# Patient Record
Sex: FEMALE | Race: WHITE
[De-identification: names, ages, dates, MRNs, and addresses within clinical notes are randomized per-mention and may not be internally consistent; named-entity substitution may affect disease eponyms.]

---

## 2020-01-02 ENCOUNTER — HOSPITAL ENCOUNTER (EMERGENCY)
Dept: HOSPITAL 75 - ER | Age: 75
Discharge: HOME | End: 2020-01-02
Payer: MEDICARE

## 2020-01-02 VITALS — BODY MASS INDEX: 30.69 KG/M2 | HEIGHT: 66.93 IN | WEIGHT: 195.55 LBS

## 2020-01-02 VITALS — DIASTOLIC BLOOD PRESSURE: 79 MMHG | SYSTOLIC BLOOD PRESSURE: 141 MMHG
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
## 2020-01-02 DIAGNOSIS — Z88.5: ICD-10-CM

## 2020-01-02 DIAGNOSIS — Z82.49: ICD-10-CM

## 2020-01-02 DIAGNOSIS — F03.90: ICD-10-CM

## 2020-01-02 DIAGNOSIS — K21.9: ICD-10-CM

## 2020-01-02 DIAGNOSIS — Z88.2: ICD-10-CM

## 2020-01-02 DIAGNOSIS — N39.0: Primary | ICD-10-CM

## 2020-01-02 DIAGNOSIS — F41.9: ICD-10-CM

## 2020-01-02 DIAGNOSIS — Z87.891: ICD-10-CM

## 2020-01-02 DIAGNOSIS — F31.9: ICD-10-CM

## 2020-01-02 DIAGNOSIS — E03.9: ICD-10-CM

## 2020-01-02 LAB
APTT PPP: YELLOW S
BACTERIA #/AREA URNS HPF: (no result) /HPF
BILIRUB UR QL STRIP: NEGATIVE
FIBRINOGEN PPP-MCNC: (no result) MG/DL
GLUCOSE UR STRIP-MCNC: NEGATIVE MG/DL
KETONES UR QL STRIP: NEGATIVE
LEUKOCYTE ESTERASE UR QL STRIP: (no result)
NITRITE UR QL STRIP: POSITIVE
PH UR STRIP: 6 [PH] (ref 5–9)
PROT UR QL STRIP: (no result)
RBC #/AREA URNS HPF: (no result) /HPF
SP GR UR STRIP: >=1.03 (ref 1.02–1.02)
WBC #/AREA URNS HPF: >100 /HPF

## 2020-01-02 PROCEDURE — 99282 EMERGENCY DEPT VISIT SF MDM: CPT

## 2020-01-02 PROCEDURE — 87088 URINE BACTERIA CULTURE: CPT

## 2020-01-02 PROCEDURE — 81000 URINALYSIS NONAUTO W/SCOPE: CPT

## 2020-01-02 PROCEDURE — 87186 SC STD MICRODIL/AGAR DIL: CPT

## 2020-01-02 PROCEDURE — 87077 CULTURE AEROBIC IDENTIFY: CPT

## 2020-01-02 NOTE — ED GENERAL
General


Chief Complaint:  Trauma-Non Activation


Stated Complaint:  FALL


Nursing Triage Note:  


AMB TO ROOM WITH STAFF REPORTS SHE FELL PTA AND HIT HER HEAD NO LOC.  REPORTS 


BUMPED INTO STAFF AND FELL. STAFF REPORT POLICY TO HAVE HER CHECKED


Nursing Sepsis Screen:  No Definite Risk





History of Present Illness


Date Seen by Provider:  Jan 2, 2020


Time Seen by Provider:  14:50


Initial Comments


74-year-old  female is a resident at [facility], staff report 

that she ran into an employee, causing her to fall. She was not a hard impact. 

She did however hit a walker and possibly the wall with her head. She has a 

history of dementia. The fall was witnessed and there was no loss of 

consciousness. She has no complaints at this time, no areas of pain or 

tenderness, and is ambulatory with a steady gait. She is hard of hearing. The 

staff does report this is her second fall in the last 2 weeks, and a requesting 

a UA to check for urinary tract infection.


Timing/Duration:  1-3 Hours


Associated Systoms:  Denies Symptoms





Allergies and Home Medications


Allergies


Coded Allergies:  


     codeine (Unverified  Allergy, Mild, 4/22/09)


     Sulfa (Sulfonamide Antibiotics) (Unverified  Allergy, Unknown, 8/3/14)





Home Medications


Acetaminophen 500 Mg Tablet, 500-1,000 MG PO EVERY 4-6 HOURS PRN for PAIN, 

(Reported)


Bupropion Hcl 200 Mg Tablet.sa, 200 MG PO BID, (Reported)


Ciprofloxacin HCl 500 Mg Tablet, 500 MG PO BID


   Prescribed by: SILVIA ROSAS on 1/2/20 1602


Cyanocobalamin 1,000 Mcg/Ml  Vial, 1,000 MCG IM MONTHLY, (Reported)


Estrogens Conjugated 45 Gm Cr, 1 APPFUL VG MON, WED, FRI, (Reported)


Lactobacillus Acidophilus 1 Each Capsule, 1 CAP PO DAILY, (Reported)


Levothyroxine Sodium 75 Mcg Tablet, 75 MCG PO DAILY, (Reported)


Losartan Potassium 25 Mg Tablet, 25 MG PO HS, (Reported)


Metoprolol Tartrate 25 Mg Tablet, 37.5 MG PO 0800, 2000, (Reported)


   TAKES 1 & 1/2 (25MG) TABLET TWICE DAILY 


Mirtazapine 30 Mg Tablet, 30 MG PO HS, (Reported)


Nitrofurantoin Macrocrystal 100 Mg Capsule, 100 MG PO DAILY, (Reported)


Pantoprazole Sodium 40 Mg Tablet.dr, 1 TAB PO DAILY


   Prescribed by: JUSTA MONROE on 8/15/14 1158


Potassium Chloride 20 Meq Tab.prt.sr, 20 MEQ PO DAILY, (Reported)


Quetiapine Fumarate 50 Mg Tablet, 50 MG PO HS, (Reported)


Quetiapine Fumarate 25 Mg Tablet, 37.5 MG PO 0800, 1400, (Reported)


   TAKES 1 & 1/2 (25MG) TABLET TWICE DAILY AT 0800 AND 1400 


Rivastigmine Tartrate 4.6 Mg Patch, 4.6 MG TOP DAILY, (Reported)


Simvastatin 10 Mg Tablet, 10 MG PO HS, (Reported)


Sucralfate 1 Gm Tab, 1 GM PO QID


   Prescribed by: JUSTA MONROE on 8/15/14 1158


[Calcium 600MG]  , 600 MG PO DAILY, (Reported)





Patient Home Medication List


Home Medication List Reviewed:  Yes





Review of Systems


Review of Systems


Constitutional:  no symptoms reported, see HPI


Musculoskeletal:  no symptoms reported, see HPI





All Other Systems Reviewed


Negative Unless Noted:  Yes





Past Medical-Social-Family Hx


Past Med/Social Hx:  Reviewed Nursing Past Med/Soc Hx


Patient Social History


Alcohol Use:  Denies Use


Recreational Drug Use:  Yes (tobacco)


Smoking Status:  Former Smoker


2nd Hand Smoke Exposure:  No


Recent Foreign Travel:  No


Contact w/Someone Who Travel:  No


Recent Infectious Disease Expo:  No





Immunizations Up To Date


Tetanus Booster (TDap):  Unknown


Date of Influenza Vaccine:  Dec 2, 2013





Past Medical History


Surgeries:  Yes (EGD/COLONOSCOPIES, BACK SURG;splenectomy)


Respiratory:  No


Cardiac:  Yes


Neurological:  No


Reproductive Disorders:  Yes


Female Reproductive Disorders:  Denies


Sexually Transmitted Disease:  No


HIV/AIDS:  No


Bladder Infection


Gastrointestinal:  Yes


Colitis, Gastroesophageal Reflux, Gall Bladder Disease


Musculoskeletal:  Yes (CHRONIC GENERALIZED PAIN)


Arthritis, Back Injury, Chronic Back Pain


Endocrine:  Yes


Hypothyroidsim


Loss of Vision:  Bilateral


Hearing Impairment:  Hard of Hearing


Cancer:  No


Psychosocial:  Yes


Sleep Difficulties, Anxiety, Bipolar, Depression


Integumentary:  No


Blood Disorders:  No


Adverse Reaction/Blood Tranf:  No





Family Medical History





Alcoholism


  19 FATHER


  G8 BROTHER


CANC


  17 MOTHER


FH: cancer


Hypertension





Physical Exam


Vital Signs





Vital Signs - First Documented








 1/2/20





 14:39


 


Temp 36.9


 


Pulse 93


 


Resp 18


 


B/P (MAP) 141/79 (99)


 


Pulse Ox 97





Capillary Refill : NONE


Height, Weight, BMI


Height: 5'4.00"


Weight: 166lbs. 9.0oz. 75.502024sr; 30.00 BMI


Method:


General Appearance:  No Apparent Distress, WD/WN


Eyes:  Bilateral Eye Normal Inspection, Bilateral Eye PERRL, Bilateral Eye EOMI


HEENT:  PERRL/EOMI, TMs Normal, Normal ENT Inspection, Pharynx Normal, Other 

(head is normocephalic with no areas of tenderness, swelling, abrasions or 

lacerations.)


Neck:  Full Range of Motion, Normal Inspection, Non Tender, Supple


Respiratory:  Chest Non Tender, Lungs Clear, Normal Breath Sounds


Cardiovascular:  Regular Rate, Rhythm, No Edema, No Murmur, Normal Peripheral 

Pulses


Genital/Rectal:  Other (steady gait)


Back:  Normal Inspection, No CVA Tenderness, No Vertebral Tenderness


Extremity:  Normal Capillary Refill, Normal Inspection, Normal Range of Motion


Neurologic/Psychiatric:  Alert, Oriented x3, No Motor/Sensory Deficits, Normal 

Mood/Affect


Skin:  Normal Color, Warm/Dry





Progress/Results/Core Measures


Suspected Sepsis


Recent Fever Within 48 Hours:  No


Infection Criteria Present:  None


New/Unexplained  Altered Menta:  No


Sepsis Screen:  No Definite Risk


SIRS


Temperature: 


Pulse: 93 


Respiratory Rate: 18


 


Blood Pressure 141 /79 


Mean: 99





Results/Orders


Lab Results





Laboratory Tests








Test


 1/2/20


15:20 Range/Units


 


 


Urine Color YELLOW   


 


Urine Clarity SL CLOUDY   


 


Urine pH 6.0  5-9  


 


Urine Specific Gravity >=1.030  1.016-1.022  


 


Urine Protein TRACE  NEGATIVE  


 


Urine Glucose (UA) NEGATIVE  NEGATIVE  


 


Urine Ketones NEGATIVE  NEGATIVE  


 


Urine Nitrite POSITIVE  NEGATIVE  


 


Urine Bilirubin NEGATIVE  NEGATIVE  


 


Urine Urobilinogen 0.2  < = 1.0  MG/DL


 


Urine Leukocyte Esterase 2+ H NEGATIVE  


 


Urine RBC (Auto) 3+ H NEGATIVE  


 


Urine RBC 5-10 H  /HPF


 


Urine WBC >100 H  /HPF


 


Urine Crystals NONE   /LPF


 


Urine Bacteria MODERATE H  /HPF


 


Urine Casts NONE   /LPF


 


Urine Mucus NEGATIVE   /LPF


 


Urine Culture Indicated YES   








My Orders





Orders - SILVIA ROSAS


Ua Culture If Indicated (1/2/20 15:02)


Urine Culture (1/2/20 15:20)





Vital Signs/I&O











 1/2/20 1/2/20





 14:39 16:06


 


Temp 36.9 36.9


 


Pulse 93 93


 


Resp 18 18


 


B/P (MAP) 141/79 (99) 141/79 (99)


 


Pulse Ox 97 97





Capillary Refill : NONE








Blood Pressure Mean:                    99








Progress Note :  


   Time:  14:50


Progress Note


Patient seen and evaluated, no indication for diagnostic imaging at this time. 

We'll obtain a UA. Denies need for pain medication. 


1600 UA results reviewed with the patient and her caregiver. Discharge 

instructions and return precautions reviewed.





Departure


Impression





   Primary Impression:  


   Fall


   Qualified Codes:  W19.XXXA - Unspecified fall, initial encounter


   Additional Impression:  


   UTI (urinary tract infection)


   Qualified Codes:  N30.01 - Acute cystitis with hematuria


Disposition:  01 HOME, SELF-CARE


Condition:  Improved





Departure-Patient Inst.


Decision time for Depature:  16:00


Referrals:  


CECI CABRERA MD (PCP/Family)


Primary Care Physician


Patient Instructions:  Preventing Falls in the Older Adult, Urinary Tract 

Infection, Adult (DC)





Add. Discharge Instructions:  


Increase water intake, 16 ounces every 2 hours while awake.


Take prescribed antibiotics as directed.


Follow-up with your primary care provider if symptoms are not improving or 

worsen.


Drink 1 cup of cranberry juice or eat 1 cup of fresh blueberries daily.


Return to emergency department for new, urgent health care needs.





All discharge instructions reviewed with patient and/or family. Voiced 

understanding.


Scripts


Ciprofloxacin HCl (Ciprofloxacin HCl) 500 Mg Tablet


500 MG PO BID, #10 TAB 0 Refills


   Prov: SILVIA ROSAS         1/2/20





Copy


Copies To 1:   CECI CABRERA MD, AMY ARNP                   Jan 2, 2020 15:01

## 2020-02-06 ENCOUNTER — HOSPITAL ENCOUNTER (EMERGENCY)
Dept: HOSPITAL 75 - ER | Age: 75
Discharge: HOME | End: 2020-02-06
Payer: MEDICARE

## 2020-02-06 VITALS — HEIGHT: 64.96 IN | WEIGHT: 176.37 LBS | BODY MASS INDEX: 29.38 KG/M2

## 2020-02-06 VITALS — DIASTOLIC BLOOD PRESSURE: 78 MMHG | SYSTOLIC BLOOD PRESSURE: 144 MMHG

## 2020-02-06 DIAGNOSIS — Z88.2: ICD-10-CM

## 2020-02-06 DIAGNOSIS — F03.90: Primary | ICD-10-CM

## 2020-02-06 DIAGNOSIS — R40.2362: ICD-10-CM
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
## 2020-02-06 DIAGNOSIS — F31.9: ICD-10-CM

## 2020-02-06 DIAGNOSIS — F41.9: ICD-10-CM

## 2020-02-06 DIAGNOSIS — R40.2142: ICD-10-CM

## 2020-02-06 DIAGNOSIS — S70.01XA: ICD-10-CM

## 2020-02-06 DIAGNOSIS — K21.9: ICD-10-CM

## 2020-02-06 DIAGNOSIS — Z88.5: ICD-10-CM

## 2020-02-06 DIAGNOSIS — E03.9: ICD-10-CM

## 2020-02-06 DIAGNOSIS — R40.2252: ICD-10-CM

## 2020-02-06 DIAGNOSIS — Z82.49: ICD-10-CM

## 2020-02-06 DIAGNOSIS — S60.512A: ICD-10-CM

## 2020-02-06 LAB
ALBUMIN SERPL-MCNC: 3.8 GM/DL (ref 3.2–4.5)
ALP SERPL-CCNC: 132 U/L (ref 40–136)
ALT SERPL-CCNC: 10 U/L (ref 0–55)
APTT PPP: YELLOW S
BACTERIA #/AREA URNS HPF: NEGATIVE /HPF
BASOPHILS # BLD AUTO: 0 10^3/UL (ref 0–0.1)
BASOPHILS NFR BLD AUTO: 0 % (ref 0–10)
BILIRUB SERPL-MCNC: 0.5 MG/DL (ref 0.1–1)
BILIRUB UR QL STRIP: NEGATIVE
BUN/CREAT SERPL: 14
CALCIUM SERPL-MCNC: 9.3 MG/DL (ref 8.5–10.1)
CHLORIDE SERPL-SCNC: 110 MMOL/L (ref 98–107)
CO2 SERPL-SCNC: 23 MMOL/L (ref 21–32)
CREAT SERPL-MCNC: 2.24 MG/DL (ref 0.6–1.3)
EOSINOPHIL # BLD AUTO: 0.1 10^3/UL (ref 0–0.3)
EOSINOPHIL NFR BLD AUTO: 2 % (ref 0–10)
ERYTHROCYTE [DISTWIDTH] IN BLOOD BY AUTOMATED COUNT: 15.2 % (ref 10–14.5)
FIBRINOGEN PPP-MCNC: (no result) MG/DL
GFR SERPLBLD BASED ON 1.73 SQ M-ARVRAT: 21 ML/MIN
GLUCOSE SERPL-MCNC: 123 MG/DL (ref 70–105)
GLUCOSE UR STRIP-MCNC: NEGATIVE MG/DL
HCT VFR BLD CALC: 38 % (ref 35–52)
HGB BLD-MCNC: 11.4 G/DL (ref 11.5–16)
KETONES UR QL STRIP: NEGATIVE
LEUKOCYTE ESTERASE UR QL STRIP: (no result)
LYMPHOCYTES # BLD AUTO: 1.4 X 10^3 (ref 1–4)
LYMPHOCYTES NFR BLD AUTO: 19 % (ref 12–44)
MANUAL DIFFERENTIAL PERFORMED BLD QL: NO
MCH RBC QN AUTO: 27 PG (ref 25–34)
MCHC RBC AUTO-ENTMCNC: 30 G/DL (ref 32–36)
MCV RBC AUTO: 88 FL (ref 80–99)
MONOCYTES # BLD AUTO: 0.4 X 10^3 (ref 0–1)
MONOCYTES NFR BLD AUTO: 6 % (ref 0–12)
NEUTROPHILS # BLD AUTO: 5.5 X 10^3 (ref 1.8–7.8)
NEUTROPHILS NFR BLD AUTO: 74 % (ref 42–75)
NITRITE UR QL STRIP: NEGATIVE
PH UR STRIP: 5.5 [PH] (ref 5–9)
PLATELET # BLD: 260 10^3/UL (ref 130–400)
PMV BLD AUTO: 9.8 FL (ref 7.4–10.4)
POTASSIUM SERPL-SCNC: 5 MMOL/L (ref 3.6–5)
PROT SERPL-MCNC: 7 GM/DL (ref 6.4–8.2)
PROT UR QL STRIP: NEGATIVE
RBC #/AREA URNS HPF: (no result) /HPF
SODIUM SERPL-SCNC: 142 MMOL/L (ref 135–145)
SP GR UR STRIP: >=1.03 (ref 1.02–1.02)
WBC # BLD AUTO: 7.5 10^3/UL (ref 4.3–11)
WBC #/AREA URNS HPF: (no result) /HPF

## 2020-02-06 PROCEDURE — 80053 COMPREHEN METABOLIC PANEL: CPT

## 2020-02-06 PROCEDURE — 71045 X-RAY EXAM CHEST 1 VIEW: CPT

## 2020-02-06 PROCEDURE — 85025 COMPLETE CBC W/AUTO DIFF WBC: CPT

## 2020-02-06 PROCEDURE — 70450 CT HEAD/BRAIN W/O DYE: CPT

## 2020-02-06 PROCEDURE — 81000 URINALYSIS NONAUTO W/SCOPE: CPT

## 2020-02-06 PROCEDURE — 87088 URINE BACTERIA CULTURE: CPT

## 2020-02-06 PROCEDURE — 72125 CT NECK SPINE W/O DYE: CPT

## 2020-02-06 PROCEDURE — 71250 CT THORAX DX C-: CPT

## 2020-02-06 PROCEDURE — 36415 COLL VENOUS BLD VENIPUNCTURE: CPT

## 2020-02-06 NOTE — DIAGNOSTIC IMAGING REPORT
INDICATION:  Unwitnessed fall this morning.



TECHNIQUE:  Single-view chest at 02:11 p.m.



CORRELATION STUDY:  01/07/2020.



FINDINGS: 

Heart size and pulmonary vasculature are generally stable.

Mediastinal configuration along the left superior margins does

appear to be slightly more prominent from prior studies.  

Chronic-appearing changes about the lung parenchyma.



Suggestion of some reparative change with likely a subacute left

lateral fifth rib fracture.



IMPRESSION: 

1. Suggestion of prominence about the superior mediastinum. This

may very well be owing to change in position; however,

possibility of underlying mediastinal pathology including

associated with the aortic arch not excluded. If clinically

warranted, correlation with CT imaging chest would be

recommended.

2. Likely subacute left lateral fifth rib fracture.



Dictated by: 



  Dictated on workstation # [id number]

## 2020-02-06 NOTE — DIAGNOSTIC IMAGING REPORT
PROCEDURE: CT head and CT cervical spine without contrast.



TECHNIQUE: Multiple contiguous axial images were obtained through

the brain and cervical spine without the use of intravenous

contrast. Sagittal and coronal reformations through the cervical

spine were then performed. Auto Exposure Controls were utilized

during the CT exam to meet ALARA standards for radiation dose

reduction. 



INDICATION:  Unwitnessed fall. Scalp contusion.



COMPARISON: 01/06/2020.



FINDINGS: 

CT head: No large acute territorial ischemia, mass, or

hemorrhage. No midline shift or mass effect. Decreased

attenuation is seen in the periventricular and subcortical white

matter. The ventricles and cortical sulci are prominent. The

basilar cisterns are patent and unremarkable. 



The calvarium is intact. The visualized paranasal sinuses are

clear.



CT cervical spine: No acute fracture or dislocation is seen in

the cervical spine. No focal osseous lesions. Vertebral body

heights are well-maintained. The craniocervical junction is

well-maintained. Moderate-to-severe degenerative changes are seen

in the cervical spine with disc osteophyte complexes and

uncovertebral arthropathy. Soft tissues of the neck are

unremarkable.



IMPRESSION:  

1. No hemorrhage or focal intra-axial mass.  No CT evidence of

large acute territorial ischemia.  

2. No acute fracture or dislocation in the cervical spine.

3. Chronic microvascular disease with generalized parenchymal

volume loss.

4. Moderate-to-severe degenerative changes in the cervical spine.



Dictated by: 



  Dictated on workstation # [id number]

## 2020-02-06 NOTE — DIAGNOSTIC IMAGING REPORT
INDICATION:  Unwitnessed fall earlier in the day.



TECHNIQUE:  AP pelvis along with 2 views right hip, 2:15 PM



CORRELATION STUDY:  

None



FINDINGS: 

The pelvis demonstrates no evidence for acute fracture. The

pectineal lines and obturator rings are maintained. Pubic

symphysis and SI joints are unremarkable. Posterior L4-L5 lumbar

spinal fusion hardware.



Bilateral hip joints demonstrate narrowing. Mildly prominent

overhanging osteophytes bilaterally. Imaging of the right hip

demonstrates the bony trabecular pattern to be intact.



IMPRESSION: 

Negative for acute abnormality about the pelvis and/or right hip.

Mild moderately advanced degenerative changes of both hips are

present. .



Dictated by: 



  Dictated on workstation # [id number]

## 2020-02-06 NOTE — DIAGNOSTIC IMAGING REPORT
EXAMINATION: CT Chest without contrast.



TECHNIQUE: Multiple contiguous axial images were obtained through

the chest without the use of intravenous contrast. All CT scans

use one or more of the following dose optimizing techniques:

automated exposure control, MA and/or KvP adjustment based on a

patient size and exam type, or iterative reconstruction. 



HISTORY: Fall.



COMPARISON: None available.



FINDINGS: 



There is no edema or pneumonia. No pleural effusion. No

pneumothorax. No suspicious nodules. There is mild dependent

atelectasis in the lungs.



Heart size is normal. No pericardial effusion. Aorta is normal in

caliber. There is no axillary or supraclavicular lymphadenopathy.

There is no mediastinal lymphadenopathy. There are moderate

coronary artery calcifications. Calcified granulomas are seen in

the right hilum.



Calcified granulomas are seen in the liver and spleen.

Gallbladder is absent. 



There are subacute-to-chronic left third, fourth, and fifth rib

fractures. There is a Schmorl's node in the inferior endplate of

T12.



IMPRESSION:



1. Subacute chronic left third, fourth, and fifth rib fractures

without evidence for acute trauma in the chest.



Dictated by: 



  Dictated on workstation # [id number]

## 2020-02-06 NOTE — ED TRAUMA-MULTISYSTEM
General


Chief Complaint:  Trauma-Non Activation


Stated Complaint:  FALL


Source of Information:  Patient, EMS


Exam Limitations:  No Limitations





History of Present Illness


Date Seen by Provider:  Feb 6, 2020


Time Seen by Provider:  13:14


Initial Comments


75-year-old female who was brought to the emergency room by [facility] 

from local nursing home with reported increased falls over the past 5 days and 

increased confusion. The patient has a baseline of dementia which is the cause 

of her needing nursing home care 24 7. She is altered at baseline. She is alert 

to person and place on arrival to the emergency room. She denies any pain from 

her falls. Nursing home staff reports that she was started on Cefdnir a few days

ago for urinary tract infection. She has some dried blood on her report lips 

staff is concerned that she struck her head with her fall this morning. She also

has a large hematoma to her right hip area from a recent fall and abrasions to 

the dorsal surface of her left hand.


Loss of Consciousness:  Unsure


Associated Symptoms (Fall):  Denies Symptoms





Allergies and Home Medications


Allergies


Coded Allergies:  


     codeine (Unverified  Allergy, Mild, 4/22/09)


     Sulfa (Sulfonamide Antibiotics) (Unverified  Allergy, Unknown, 8/3/14)





Home Medications


Acetaminophen 500 Mg Tablet, 500-1,000 MG PO EVERY 4-6 HOURS PRN for PAIN-MILD 

(1-4), (Reported)


Amoxicillin/Potassium Clav 1 Each Tablet, 1 EACH PO BID


   Prescribed by: CECI DANIELLE on 1/7/20 0857


Bupropion HCl 200 Mg Tablet.er, 200 MG PO BID, (Reported)


Calcium Carbonate 600 Mg Tablet, 600 MG PO DAILY, (Reported)


Cholecalciferol (Vitamin D3) 5,000 Unit Capsule, 5,000 UNIT PO DAILY, (Reported)


Cyanocobalamin 1,000 Mcg/Ml Inj, 1,000 MCG IM MONTHLY ON THE 5TH, (Reported)


Cyanocobalamin (Vitamin B-12) 1,000 Mcg Tab.subl, 1,000 MCG SL DAILY, (Reported)


Fluvoxamine Maleate 100 Mg Tablet, 100 MG PO DAILY, (Reported)


Fluvoxamine Maleate 50 Mg Tablet, 50 MG PO HS, (Reported)


L. Acidophilus/Bulgaricus 1 Each Tablet, 1 TAB PO UD


   THREE TIMES DAILY X 1 WEEK THEN DAILY THEREAFTER 


   Prescribed by: CECI DANIELLE on 1/7/20 0857


Levothyroxine Sodium 88 Mcg Tablet, 88 MCG PO DAILY, (Reported)


Losartan Potassium 25 Mg Tablet, 25 MG PO HS, (Reported)


Memantine HCl 28 Mg Cap.spr.24, 28 MG PO DAILY, (Reported)


Metoprolol Tartrate 25 Mg Tablet, 37.5 MG PO BID, (Reported)


   TAKES 1 & 1/2 (25MG) TABLETS 


Mirtazapine 15 Mg Tablet, 15 MG PO HS, (Reported)


Omeprazole 20 Mg Capsule.dr, 20 MG PO HS, (Reported)


Potassium Chloride 20 Meq Tab.er.prt, 20 MEQ PO DAILY, (Reported)


Quetiapine Fumarate 25 Mg Tablet, 25 MG PO HS, (Reported)


Quetiapine Fumarate 25 Mg Tablet, 12.5 MG PO 0800,1400, (Reported)


   TAKES 1/2 (25MG) TABLET 


Simvastatin 10 Mg Tablet, 10 MG PO HS, (Reported)


Sucralfate 1 Gm Tablet, 1 GM PO UD PRN for STOMACH UPSET, (Reported)


Trimethoprim 100 Mg Tablet, 100 MG PO DAILY


   DO NOT RESTART UNTIL 1/13/2020 


   Prescribed by: CECI DANIELLE on 1/7/20 0857





Patient Home Medication List


Home Medication List Reviewed:  Yes





Review of Systems


Review of Systems


Constitutional:  see HPI; No chills, No fever


Musculoskeletal:  see HPI, other (recent fall)


Psychiatric/Neurological:  See HPI, Other (increased confusion)





All Other Systems Reviewed


Negative Unless Noted:  Yes





Past Medical-Social-Family Hx


Past Med/Social Hx:  Reviewed Nursing Past Med/Soc Hx


Patient Social History


Type Used:  Cigarettes


2nd Hand Smoke Exposure:  No


Recent Hopitalizations:  No





Immunizations Up To Date


Tetanus Booster (TDap):  Unknown


Date of Influenza Vaccine:  Oct 1, 2019





Seasonal Allergies


Seasonal Allergies:  Yes





Past Medical History


Surgeries:  Yes (EGD/COLONOSCOPIES, BACK SURG;splenectomy)


Breast, Gallbladder, Hysterectomy, Orthopedic


Respiratory:  No


Cardiac:  Yes


Neurological:  No


Reproductive Disorders:  Yes


Female Reproductive Disorders:  Denies


Sexually Transmitted Disease:  No


HIV/AIDS:  No


Bladder Infection


Gastrointestinal:  Yes


Colitis, Gastroesophageal Reflux, Gall Bladder Disease


Musculoskeletal:  Yes (CHRONIC GENERALIZED PAIN)


Arthritis, Back Injury, Chronic Back Pain


Endocrine:  Yes


Hypothyroidsim


Loss of Vision:  Bilateral


Hearing Impairment:  Hard of Hearing


Cancer:  No


Psychosocial:  Yes


Sleep Difficulties, Anxiety, Bipolar, Depression


Integumentary:  No


Blood Disorders:  No


Adverse Reaction/Blood Tranf:  No





Family Medical History


Reviewed Nursing Family Hx





Alcoholism


  19 FATHER


  G8 BROTHER


CANC


  19 MOTHER


FH: cancer


Cancer, Hypertension





Physical Exam


Vital Signs





Vital Signs - First Documented








 2/6/20





 13:26


 


Temp 37.1


 


Pulse 84


 


Resp 20


 


B/P (MAP) 144/78 (100)


 


Pulse Ox 94








Height, Weight, BMI


Height: 5'4.00"


Weight: 166lbs. 9.0oz. 75.632050ed; 32.32 BMI


Method:


General Appearance:  No Apparent Distress, WD/WN


Head:  Ecchymosis (large area of ecchymosis and hematoma to her right hip.), 

Other (abrasions to the dorsal surface of her left hand)


Ears, Nose, Throat:  No Evidence of ENT Injury, No Dental Injury, Other (hard of

hearing)


Neck:  Full Range of Motion, Normal Inspection, Non Tender, Supple


Cardiovascular:  Regular Rate, Rhythm, No Edema, No Gallop, No JVD, No Murmur, 

Normal Peripheral Pulses


Respiratory:  Chest Non Tender, Lungs Clear, Normal Breath Sounds, No Accessory 

Muscle Use, No Respiratory Distress


Gastrointestinal:  Normal Bowel Sounds, No Organomegaly, No Pulsatile Mass, Non 

Tender, Soft


Extremity:  Normal Capillary Refill


Neurologic/Psychiatric:  Alert, Normal Mood/Affect, Other (baseline of dementia)


Skin:  Normal Color, Warm/Dry





Simon Coma Score


Best Eye Response ([place]):  (4) Open Spontaneously


Best Verbal Response ([place]):  (5) Oriented


Best Motor Response ([place]):  (6) Obeys Commands





Progress/Results/Core Measures


Results/Orders


Lab Results





Laboratory Tests








Test


 2/6/20


13:09 2/6/20


13:15 2/6/20


15:35 Range/Units


 


 


Lab Scanned Report


 Referred Lab


Report 


 


  [id number]





 


White Blood Count


 


 7.5 


 


 4.3-11.0


10^3/uL


 


Red Blood Count


 


 4.30 L


 


 4.35-5.85


10^6/uL


 


Hemoglobin  11.4 L  11.5-16.0  G/DL


 


Hematocrit  38   35-52  %


 


Mean Corpuscular Volume  88   80-99  FL


 


Mean Corpuscular Hemoglobin  27   25-34  PG


 


Mean Corpuscular Hemoglobin


Concent 


 30 L


 


 32-36  G/DL





 


Red Cell Distribution Width  15.2 H  10.0-14.5  %


 


Platelet Count


 


 260 


 


 130-400


10^3/uL


 


Mean Platelet Volume  9.8   7.4-10.4  FL


 


Neutrophils (%) (Auto)  74   42-75  %


 


Lymphocytes (%) (Auto)  19   12-44  %


 


Monocytes (%) (Auto)  6   0-12  %


 


Eosinophils (%) (Auto)  2   0-10  %


 


Basophils (%) (Auto)  0   0-10  %


 


Neutrophils # (Auto)  5.5   1.8-7.8  X 10^3


 


Lymphocytes # (Auto)  1.4   1.0-4.0  X 10^3


 


Monocytes # (Auto)  0.4   0.0-1.0  X 10^3


 


Eosinophils # (Auto)


 


 0.1 


 


 0.0-0.3


10^3/uL


 


Basophils # (Auto)


 


 0.0 


 


 0.0-0.1


10^3/uL


 


Sodium Level  142   135-145  MMOL/L


 


Potassium Level  5.0   3.6-5.0  MMOL/L


 


Chloride Level  110 H    MMOL/L


 


Carbon Dioxide Level  23   21-32  MMOL/L


 


Anion Gap  9   5-14  MMOL/L


 


Blood Urea Nitrogen  31 H  7-18  MG/DL


 


Creatinine


 


 2.24 H


 


 0.60-1.30


MG/DL


 


Estimat Glomerular Filtration


Rate 


 21 


 


  





 


BUN/Creatinine Ratio  14    


 


Glucose Level  123 H    MG/DL


 


Calcium Level  9.3   8.5-10.1  MG/DL


 


Corrected Calcium  9.5   8.5-10.1  MG/DL


 


Total Bilirubin  0.5   0.1-1.0  MG/DL


 


Aspartate Amino Transf


(AST/SGOT) 


 11 


 


 5-34  U/L





 


Alanine Aminotransferase


(ALT/SGPT) 


 10 


 


 0-55  U/L





 


Alkaline Phosphatase  132     U/L


 


Total Protein  7.0   6.4-8.2  GM/DL


 


Albumin  3.8   3.2-4.5  GM/DL


 


Urine Color   YELLOW   


 


Urine Clarity   CLOUDY   


 


Urine pH   5.5  5-9  


 


Urine Specific Gravity   >=1.030  1.016-1.022  


 


Urine Protein   NEGATIVE  NEGATIVE  


 


Urine Glucose (UA)   NEGATIVE  NEGATIVE  


 


Urine Ketones   NEGATIVE  NEGATIVE  


 


Urine Nitrite   NEGATIVE  NEGATIVE  


 


Urine Bilirubin   NEGATIVE  NEGATIVE  


 


Urine Urobilinogen   0.2  < = 1.0  MG/DL


 


Urine Leukocyte Esterase   2+ H NEGATIVE  


 


Urine RBC (Auto)   TRACE-I  NEGATIVE  


 


Urine RBC   2-5 H  /HPF


 


Urine WBC   TNTC H  /HPF


 


Urine Squamous Epithelial


Cells 


 


 10-25 H


  /HPF





 


Urine Crystals   NONE   /LPF


 


Urine Bacteria   NEGATIVE   /HPF


 


Urine Casts   NONE   /LPF


 


Urine Mucus   NEGATIVE   /LPF


 


Urine Culture Indicated   YES   








Micro Results





Microbiology


2/6/20 Urine Culture - Final, Complete


         Gram Pos Mixed Bacterial Latasha


         Corynebacterium JK(Jeikeium)





My Orders





Orders - DANG BROWNLEE


Ct Head/Cervical Spine Wo (2/6/20 13:12)


Chest 1 View, Ap/Pa Only (2/6/20 13:12)


Pelvis With Right Hip 2-3views (2/6/20 13:12)


Comprehensive Metabolic Panel (2/6/20 13:12)


Ua Culture If Indicated (2/6/20 13:12)


Ed Iv/Invasive Line Start (2/6/20 13:12)


Cbc With Automated Diff (2/6/20 13:12)


Ns Iv 1000 Ml (Sodium Chloride 0.9%) (2/6/20 15:00)


Ct Chest Wo (2/6/20 16:14)


Urine Culture (2/6/20 15:35)





Vital Signs/I&O











 2/6/20 2/6/20





 13:26 17:55


 


Temp 37.1 37.1


 


Pulse 84 84


 


Resp 20 20


 


B/P (MAP) 144/78 (100) 144/78 (100)


 


Pulse Ox 94 94











Progress


Progress Note :  


   Time:  17:17


Progress Note


I have seen and evaluated the patient. Findings were discussed with patient 

family and nursing home staff via phone. Patient has follow up with Dr. Farah

tons office this week.





Departure


Impression





   Primary Impression:  


   Advancing dementia


Disposition:  01 HOME, SELF-CARE


Condition:  Stable/Unchanged





Departure-Patient Inst.


Decision time for Depature:  17:17


Referrals:  


CECI DANIELLE MD (PCP/Family)


Primary Care Physician


Patient Instructions:  Dementia (DC)





Add. Discharge Instructions:  


Continue medications as previously prescribed. Follow-up with Dr. Danielle's 

office within 1 week for recheck. Return back to the emergency room for 

worsening symptoms or concerns as needed. All discharge instructions reviewed w

[facility] patient and/or family. Voiced understanding.











DANG BROWNLEE                   Feb 6, 2020 13:28

## 2020-07-06 ENCOUNTER — HOSPITAL ENCOUNTER (EMERGENCY)
Dept: HOSPITAL 75 - ER | Age: 75
Discharge: NURSING, REHAB OR LONG-TERM CARE | End: 2020-07-06
Payer: MEDICARE

## 2020-07-06 VITALS — WEIGHT: 180.78 LBS | HEIGHT: 66.93 IN | BODY MASS INDEX: 28.37 KG/M2

## 2020-07-06 VITALS — DIASTOLIC BLOOD PRESSURE: 80 MMHG | SYSTOLIC BLOOD PRESSURE: 143 MMHG

## 2020-07-06 DIAGNOSIS — M54.9: ICD-10-CM

## 2020-07-06 DIAGNOSIS — K52.9: ICD-10-CM

## 2020-07-06 DIAGNOSIS — Z90.81: ICD-10-CM

## 2020-07-06 DIAGNOSIS — Z79.899: ICD-10-CM

## 2020-07-06 DIAGNOSIS — K21.9: ICD-10-CM

## 2020-07-06 DIAGNOSIS — F41.9: ICD-10-CM

## 2020-07-06 DIAGNOSIS — Z88.5: ICD-10-CM

## 2020-07-06 DIAGNOSIS — Z79.890: ICD-10-CM

## 2020-07-06 DIAGNOSIS — Z88.2: ICD-10-CM

## 2020-07-06 DIAGNOSIS — K59.09: ICD-10-CM

## 2020-07-06 DIAGNOSIS — N39.0: Primary | ICD-10-CM

## 2020-07-06 DIAGNOSIS — E03.9: ICD-10-CM

## 2020-07-06 DIAGNOSIS — F31.9: ICD-10-CM

## 2020-07-06 DIAGNOSIS — M19.90: ICD-10-CM

## 2020-07-06 LAB
ALBUMIN SERPL-MCNC: 3.8 GM/DL (ref 3.2–4.5)
ALP SERPL-CCNC: 144 U/L (ref 40–136)
ALT SERPL-CCNC: 8 U/L (ref 0–55)
APTT PPP: YELLOW S
BACTERIA #/AREA URNS HPF: (no result) /HPF
BASOPHILS # BLD AUTO: 0 10^3/UL (ref 0–0.1)
BASOPHILS NFR BLD AUTO: 0 % (ref 0–10)
BILIRUB SERPL-MCNC: 0.2 MG/DL (ref 0.1–1)
BILIRUB UR QL STRIP: NEGATIVE
BUN/CREAT SERPL: 9
CALCIUM SERPL-MCNC: 8.8 MG/DL (ref 8.5–10.1)
CHLORIDE SERPL-SCNC: 110 MMOL/L (ref 98–107)
CO2 SERPL-SCNC: 15 MMOL/L (ref 21–32)
CREAT SERPL-MCNC: 2.93 MG/DL (ref 0.6–1.3)
EOSINOPHIL # BLD AUTO: 0.3 10^3/UL (ref 0–0.3)
EOSINOPHIL NFR BLD AUTO: 3 % (ref 0–10)
ERYTHROCYTE [DISTWIDTH] IN BLOOD BY AUTOMATED COUNT: 15.2 % (ref 10–14.5)
FIBRINOGEN PPP-MCNC: (no result) MG/DL
GFR SERPLBLD BASED ON 1.73 SQ M-ARVRAT: 16 ML/MIN
GLUCOSE SERPL-MCNC: 124 MG/DL (ref 70–105)
GLUCOSE UR STRIP-MCNC: NEGATIVE MG/DL
HCT VFR BLD CALC: 41 % (ref 35–52)
HGB BLD-MCNC: 12.5 G/DL (ref 11.5–16)
KETONES UR QL STRIP: NEGATIVE
LEUKOCYTE ESTERASE UR QL STRIP: (no result)
LYMPHOCYTES # BLD AUTO: 3 X 10^3 (ref 1–4)
LYMPHOCYTES NFR BLD AUTO: 32 % (ref 12–44)
MANUAL DIFFERENTIAL PERFORMED BLD QL: NO
MCH RBC QN AUTO: 27 PG (ref 25–34)
MCHC RBC AUTO-ENTMCNC: 31 G/DL (ref 32–36)
MCV RBC AUTO: 87 FL (ref 80–99)
MONOCYTES # BLD AUTO: 0.6 X 10^3 (ref 0–1)
MONOCYTES NFR BLD AUTO: 7 % (ref 0–12)
NEUTROPHILS # BLD AUTO: 5.3 X 10^3 (ref 1.8–7.8)
NEUTROPHILS NFR BLD AUTO: 58 % (ref 42–75)
NITRITE UR QL STRIP: NEGATIVE
PH UR STRIP: 6 [PH] (ref 5–9)
PLATELET # BLD: 235 10^3/UL (ref 130–400)
PMV BLD AUTO: 10.3 FL (ref 7.4–10.4)
POTASSIUM SERPL-SCNC: 4.7 MMOL/L (ref 3.6–5)
PROT SERPL-MCNC: 6.9 GM/DL (ref 6.4–8.2)
PROT UR QL STRIP: NEGATIVE
RBC #/AREA URNS HPF: (no result) /HPF
RENAL EPI CELLS #/AREA URNS HPF: (no result) /HPF
SODIUM SERPL-SCNC: 142 MMOL/L (ref 135–145)
SP GR UR STRIP: 1.02 (ref 1.02–1.02)
SQUAMOUS #/AREA URNS HPF: (no result) /HPF
T4 FREE SERPL-MCNC: 0.92 NG/DL (ref 0.7–1.48)
WBC # BLD AUTO: 9.2 10^3/UL (ref 4.3–11)
WBC #/AREA URNS HPF: (no result) /HPF
YEAST #/AREA URNS HPF: (no result) /HPF

## 2020-07-06 PROCEDURE — 84443 ASSAY THYROID STIM HORMONE: CPT

## 2020-07-06 PROCEDURE — 87077 CULTURE AEROBIC IDENTIFY: CPT

## 2020-07-06 PROCEDURE — 36415 COLL VENOUS BLD VENIPUNCTURE: CPT

## 2020-07-06 PROCEDURE — 81000 URINALYSIS NONAUTO W/SCOPE: CPT

## 2020-07-06 PROCEDURE — 80053 COMPREHEN METABOLIC PANEL: CPT

## 2020-07-06 PROCEDURE — 93005 ELECTROCARDIOGRAM TRACING: CPT

## 2020-07-06 PROCEDURE — 84439 ASSAY OF FREE THYROXINE: CPT

## 2020-07-06 PROCEDURE — 85025 COMPLETE CBC W/AUTO DIFF WBC: CPT

## 2020-07-06 PROCEDURE — 87088 URINE BACTERIA CULTURE: CPT

## 2020-07-06 PROCEDURE — 87186 SC STD MICRODIL/AGAR DIL: CPT

## 2020-07-06 NOTE — ED GENERAL
General


Stated Complaint:  SYNCOPE EPISODE


Source of Information:  Patient


Exam Limitations:  No Limitations





History of Present Illness


Date Seen by Provider:  Jul 6, 2020


Time Seen by Provider:  15:21


Initial Comments


To ER by EMS from [facility] here in [place] with reports of an 

unresponsive episode at the nursing home today lasting about 10 minutes. Upon 

EMS arrival she was alert and oriented with normal vital signs. On arrival to ER

she states she doesn't have any pain nausea vomiting shortness of breath. She 

states she just feels "blah..up here" and points to her head. She reports that 

she is depressed and has been depressed for 20 years. She is not interested in 

going inpatient for any treatment for this. She is not homicidal or suicidal


Timing/Duration:  1-3 Hours


Severity:  Moderate


Associated Systoms:  Denies Symptoms





Allergies and Home Medications


Allergies


Coded Allergies:  


     codeine (Unverified  Allergy, Mild, 4/22/09)


     Sulfa (Sulfonamide Antibiotics) (Unverified  Allergy, Unknown, 8/3/14)





Home Medications


Acetaminophen 500 Mg Tablet, 500-1,000 MG PO EVERY 4-6 HOURS PRN for PAIN-MILD 

(1-4), (Reported)


Amoxicillin/Potassium Clav 1 Each Tablet, 1 EACH PO BID


   Prescribed by: CECI DANIELLE on 1/7/20 0857


Bupropion HCl 200 Mg Tablet.er, 200 MG PO BID, (Reported)


Calcium Carbonate 600 Mg Tablet, 600 MG PO DAILY, (Reported)


Cefuroxime Axetil 500 Mg Tablet, 500 MG PO BID


   Prescribed by: JOSIE BRIDGES on 7/6/20 1553


Cholecalciferol (Vitamin D3) 5,000 Unit Capsule, 5,000 UNIT PO DAILY, (Reported)


Cyanocobalamin 1,000 Mcg/Ml Inj, 1,000 MCG IM MONTHLY ON THE 5TH, (Reported)


Cyanocobalamin (Vitamin B-12) 1,000 Mcg Tab.subl, 1,000 MCG SL DAILY, (Reported)


Fluvoxamine Maleate 100 Mg Tablet, 100 MG PO DAILY, (Reported)


Fluvoxamine Maleate 50 Mg Tablet, 50 MG PO HS, (Reported)


L. Acidophilus/Bulgaricus 1 Each Tablet, 1 TAB PO UD


   THREE TIMES DAILY X 1 WEEK THEN DAILY THEREAFTER 


   Prescribed by: CECI DANIELLE on 1/7/20 0857


Levothyroxine Sodium 88 Mcg Tablet, 88 MCG PO DAILY, (Reported)


Losartan Potassium 25 Mg Tablet, 25 MG PO HS, (Reported)


Memantine HCl 28 Mg Cap.spr.24, 28 MG PO DAILY, (Reported)


Metoprolol Tartrate 25 Mg Tablet, 37.5 MG PO BID, (Reported)


   TAKES 1 & 1/2 (25MG) TABLETS 


Mirtazapine 15 Mg Tablet, 15 MG PO HS, (Reported)


Omeprazole 20 Mg Capsule.dr, 20 MG PO HS, (Reported)


Potassium Chloride 20 Meq Tab.er.prt, 20 MEQ PO DAILY, (Reported)


Quetiapine Fumarate 25 Mg Tablet, 25 MG PO HS, (Reported)


Quetiapine Fumarate 25 Mg Tablet, 12.5 MG PO 0800,1400, (Reported)


   TAKES 1/2 (25MG) TABLET 


Simvastatin 10 Mg Tablet, 10 MG PO HS, (Reported)


Sucralfate 1 Gm Tablet, 1 GM PO UD PRN for STOMACH UPSET, (Reported)


Trimethoprim 100 Mg Tablet, 100 MG PO DAILY


   DO NOT RESTART UNTIL 1/13/2020 


   Prescribed by: CECI DANIELLE on 1/7/20 0857





Patient Home Medication List


Home Medication List Reviewed:  Yes





Review of Systems


Review of Systems


Constitutional:  see HPI


EENTM:  see HPI


Respiratory:  no symptoms reported


Cardiovascular:  no symptoms reported


Genitourinary:  no symptoms reported


Musculoskeletal:  no symptoms reported


Skin:  no symptoms reported


Psychiatric/Neurological:  See HPI, Depressed


Hematologic/Lymphatic:  No Symptoms Reported





Past Medical-Social-Family Hx


Patient Social History


Type Used:  Cigarettes


2nd Hand Smoke Exposure:  No


Recent Foreign Travel:  No


Contact w/Someone Who Travel:  No


Recent Hopitalizations:  No





Immunizations Up To Date


Tetanus Booster (TDap):  Unknown


Date of Influenza Vaccine:  Oct 1, 2019





Seasonal Allergies


Seasonal Allergies:  Yes





Past Medical History


Surgeries:  Yes (EGD/COLONOSCOPIES, BACK SURG;splenectomy)


Breast, Gallbladder, Hysterectomy, Orthopedic


Respiratory:  No


Cardiac:  Yes


Neurological:  No


Reproductive Disorders:  Yes


Female Reproductive Disorders:  Denies


Sexually Transmitted Disease:  No


HIV/AIDS:  No


Bladder Infection


Gastrointestinal:  Yes


Colitis, Gastroesophageal Reflux, Gall Bladder Disease


Musculoskeletal:  Yes (CHRONIC GENERALIZED PAIN)


Arthritis, Back Injury, Chronic Back Pain


Endocrine:  Yes


Hypothyroidsim


Loss of Vision:  Bilateral


Hearing Impairment:  Hard of Hearing


Cancer:  No


Psychosocial:  Yes


Sleep Difficulties, Anxiety, Bipolar, Depression


Integumentary:  No


Blood Disorders:  No


Adverse Reaction/Blood Tranf:  No





Family Medical History





Alcoholism


  19 FATHER


  G8 BROTHER


CANC


  17 MOTHER


FH: cancer


Cancer, Hypertension





Physical Exam


Vital Signs





Vital Signs - First Documented








 7/6/20





 15:15


 


Temp 36.7


 


Pulse 83


 


Resp 18


 


B/P (MAP) 158/77 (104)





Capillary Refill :


Height, Weight, BMI


Height: 5'4.00"


Weight: 166lbs. 9.0oz. [id number]; 29.00 BMI


Method:


General Appearance:  No Apparent Distress, WD/WN, Other (alert and oriented, fl

at affect)


Eyes:  Bilateral Eye Normal Inspection, Bilateral Eye PERRL


HEENT:  PERRL/EOMI, TMs Normal


Respiratory:  No Accessory Muscle Use, No Respiratory Distress


Cardiovascular:  Regular Rate, Rhythm, Normal Peripheral Pulses


Gastrointestinal:  Non Tender, Soft


Neurologic/Psychiatric:  Alert, Oriented x3


Skin:  Normal Color, Warm/Dry





Progress/Results/Core Measures


Suspected Sepsis


SIRS


Temperature: 


Pulse:  


Respiratory Rate: 


 


Laboratory Tests


7/6/20 15:15: White Blood Count 9.2


Blood Pressure  / 


Mean: 


 





Laboratory Tests


7/6/20 15:15: 


Creatinine 2.93H, Platelet Count 235, Total Bilirubin 0.2








Results/Orders


Lab Results





Laboratory Tests








Test


 7/6/20


15:15 7/6/20


15:20 Range/Units


 


 


White Blood Count


 9.2 


 


 4.3-11.0


10^3/uL


 


Red Blood Count


 4.67 


 


 4.35-5.85


10^6/uL


 


Hemoglobin 12.5   11.5-16.0  G/DL


 


Hematocrit 41   35-52  %


 


Mean Corpuscular Volume 87   80-99  FL


 


Mean Corpuscular Hemoglobin 27   25-34  PG


 


Mean Corpuscular Hemoglobin


Concent 31 L


 


 32-36  G/DL





 


Red Cell Distribution Width 15.2 H  10.0-14.5  %


 


Platelet Count


 235 


 


 130-400


10^3/uL


 


Mean Platelet Volume 10.3   7.4-10.4  FL


 


Neutrophils (%) (Auto) 58   42-75  %


 


Lymphocytes (%) (Auto) 32   12-44  %


 


Monocytes (%) (Auto) 7   0-12  %


 


Eosinophils (%) (Auto) 3   0-10  %


 


Basophils (%) (Auto) 0   0-10  %


 


Neutrophils # (Auto) 5.3   1.8-7.8  X 10^3


 


Lymphocytes # (Auto) 3.0   1.0-4.0  X 10^3


 


Monocytes # (Auto) 0.6   0.0-1.0  X 10^3


 


Eosinophils # (Auto)


 0.3 


 


 0.0-0.3


10^3/uL


 


Basophils # (Auto)


 0.0 


 


 0.0-0.1


10^3/uL


 


Sodium Level 142   135-145  MMOL/L


 


Potassium Level 4.7   3.6-5.0  MMOL/L


 


Chloride Level 110 H    MMOL/L


 


Carbon Dioxide Level 15 L  21-32  MMOL/L


 


Anion Gap 17 H  5-14  MMOL/L


 


Blood Urea Nitrogen 27 H  7-18  MG/DL


 


Creatinine


 2.93 H


 


 0.60-1.30


MG/DL


 


Estimat Glomerular Filtration


Rate 16 


 


  





 


BUN/Creatinine Ratio 9    


 


Glucose Level 124 H    MG/DL


 


Calcium Level 8.8   8.5-10.1  MG/DL


 


Corrected Calcium 9.0   8.5-10.1  MG/DL


 


Total Bilirubin 0.2   0.1-1.0  MG/DL


 


Aspartate Amino Transf


(AST/SGOT) 10 


 


 5-34  U/L





 


Alanine Aminotransferase


(ALT/SGPT) 8 


 


 0-55  U/L





 


Alkaline Phosphatase 144 H    U/L


 


Total Protein 6.9   6.4-8.2  GM/DL


 


Albumin 3.8   3.2-4.5  GM/DL


 


Thyroid Stimulating Hormone


(TSH) 1.97 


 


 0.35-4.94


UIU/ML


 


Urine Color  YELLOW   


 


Urine Clarity  CLOUDY   


 


Urine pH  6.0  5-9  


 


Urine Specific Gravity  1.025 H 1.016-1.022  


 


Urine Protein  NEGATIVE  NEGATIVE  


 


Urine Glucose (UA)  NEGATIVE  NEGATIVE  


 


Urine Ketones  NEGATIVE  NEGATIVE  


 


Urine Nitrite  NEGATIVE  NEGATIVE  


 


Urine Bilirubin  NEGATIVE  NEGATIVE  


 


Urine Urobilinogen  0.2  < = 1.0  MG/DL


 


Urine Leukocyte Esterase  3+ H NEGATIVE  


 


Urine RBC (Auto)  TRACE-I  NEGATIVE  


 


Urine RBC  2-5 H  /HPF


 


Urine WBC  TNTC H  /HPF


 


Urine Squamous Epithelial


Cells 


 0-2 


  /HPF





 


Urine Renal Epithelial Cells  0-2   /HPF


 


Urine Crystals  NONE   /LPF


 


Urine Bacteria  FEW H  /HPF


 


Urine Casts  NONE   /LPF


 


Urine Mucus  NEGATIVE   /LPF


 


Urine Yeast  MODERATE H  /HPF


 


Urine Culture Indicated  YES   








My Orders





Orders - JOSIE BRIDGES APRN


Cbc With Automated Diff (7/6/20 15:18)


Thyroid Stimulating Hormone (7/6/20 15:18)


Free T4 (Free Thyroxine) (7/6/20 15:18)


Comprehensive Metabolic Panel (7/6/20 15:18)


Ua Culture If Indicated (7/6/20 15:18)


Urine Culture (7/6/20 15:20)


Ceftriaxone  For Iv Use (Rocephin  For I (7/6/20 16:00)


Ns Iv 500 Ml (Sodium Chloride 0.9%) (7/6/20 16:15)





Vital Signs/I&O











 7/6/20





 15:15


 


Temp 36.7


 


Pulse 83


 


Resp 18


 


B/P (MAP) 158/77 (104)





Capillary Refill :





Departure


Impression





   Primary Impression:  


   UTI (urinary tract infection)


   Qualified Codes:  N39.0 - Urinary tract infection, site not specified


   Additional Impressions:  


   Depression


   Qualified Codes:  F32.9 - Major depressive disorder, single episode, 

   unspecified


   ADARSH (acute kidney injury)


Disposition:  01 HOME, SELF-CARE


Condition:  Stable





Departure-Patient Inst.


Decision time for Depature:  15:52


Referrals:  


CECI DANIELLE MD (PCP/Family)


Primary Care Physician


Patient Instructions:  Urinary Tract Infections in Adults





Add. Discharge Instructions:  


Call dr Danielle for an appointment for follow up. Antibiotics as directed. 

Return to ER for any worsening. Start the oral antibiotics tomorrow.


Scripts


Cefuroxime Axetil (Cefuroxime) 500 Mg Tablet


500 MG PO BID, #10 TAB


   Prov: JOSIE BRIDGES         7/6/20





Copy


Copies To 1:   CECI DANIELLE MD, PETER J APRN              Jul 6, 2020 15:23

## 2021-01-18 ENCOUNTER — HOSPITAL ENCOUNTER (EMERGENCY)
Dept: HOSPITAL 75 - ER | Age: 76
Discharge: HOME | End: 2021-01-18
Payer: MEDICARE

## 2021-01-18 VITALS — SYSTOLIC BLOOD PRESSURE: 126 MMHG | DIASTOLIC BLOOD PRESSURE: 75 MMHG

## 2021-01-18 VITALS — HEIGHT: 65.75 IN | BODY MASS INDEX: 24.38 KG/M2 | WEIGHT: 149.91 LBS

## 2021-01-18 DIAGNOSIS — Z88.2: ICD-10-CM

## 2021-01-18 DIAGNOSIS — Z79.890: ICD-10-CM
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
## 2021-01-18 DIAGNOSIS — N39.0: ICD-10-CM

## 2021-01-18 DIAGNOSIS — I10: ICD-10-CM

## 2021-01-18 DIAGNOSIS — Z91.041: ICD-10-CM

## 2021-01-18 DIAGNOSIS — Z88.6: ICD-10-CM

## 2021-01-18 DIAGNOSIS — E78.00: ICD-10-CM

## 2021-01-18 DIAGNOSIS — R25.1: ICD-10-CM

## 2021-01-18 DIAGNOSIS — Z88.5: ICD-10-CM

## 2021-01-18 DIAGNOSIS — Z82.49: ICD-10-CM

## 2021-01-18 DIAGNOSIS — Z80.9: ICD-10-CM

## 2021-01-18 DIAGNOSIS — K21.9: ICD-10-CM

## 2021-01-18 DIAGNOSIS — F31.9: ICD-10-CM

## 2021-01-18 DIAGNOSIS — E03.9: ICD-10-CM

## 2021-01-18 DIAGNOSIS — S00.83XA: Primary | ICD-10-CM

## 2021-01-18 DIAGNOSIS — R41.82: ICD-10-CM

## 2021-01-18 DIAGNOSIS — Z88.1: ICD-10-CM

## 2021-01-18 LAB
ALBUMIN SERPL-MCNC: 3.8 GM/DL (ref 3.2–4.5)
ALP SERPL-CCNC: 125 U/L (ref 40–136)
ALT SERPL-CCNC: 11 U/L (ref 0–55)
APTT PPP: YELLOW S
BACTERIA #/AREA URNS HPF: (no result) /HPF
BASOPHILS # BLD AUTO: 0 10^3/UL (ref 0–0.1)
BASOPHILS NFR BLD AUTO: 0 % (ref 0–10)
BILIRUB SERPL-MCNC: 0.5 MG/DL (ref 0.1–1)
BILIRUB UR QL STRIP: NEGATIVE
BUN/CREAT SERPL: 16
CALCIUM SERPL-MCNC: 9.4 MG/DL (ref 8.5–10.1)
CHLORIDE SERPL-SCNC: 110 MMOL/L (ref 98–107)
CO2 SERPL-SCNC: 20 MMOL/L (ref 21–32)
CREAT SERPL-MCNC: 2.96 MG/DL (ref 0.6–1.3)
EOSINOPHIL # BLD AUTO: 0 10^3/UL (ref 0–0.3)
EOSINOPHIL NFR BLD AUTO: 0 % (ref 0–10)
FIBRINOGEN PPP-MCNC: (no result) MG/DL
GFR SERPLBLD BASED ON 1.73 SQ M-ARVRAT: 15 ML/MIN
GLUCOSE SERPL-MCNC: 110 MG/DL (ref 70–105)
GLUCOSE UR STRIP-MCNC: NEGATIVE MG/DL
HCT VFR BLD CALC: 41 % (ref 35–52)
HGB BLD-MCNC: 12.8 G/DL (ref 11.5–16)
KETONES UR QL STRIP: NEGATIVE
LEUKOCYTE ESTERASE UR QL STRIP: (no result)
LYMPHOCYTES # BLD AUTO: 1.2 10^3/UL (ref 1–4)
LYMPHOCYTES NFR BLD AUTO: 9 % (ref 12–44)
MAGNESIUM SERPL-MCNC: 1.9 MG/DL (ref 1.6–2.4)
MANUAL DIFFERENTIAL PERFORMED BLD QL: NO
MCH RBC QN AUTO: 28 PG (ref 25–34)
MCHC RBC AUTO-ENTMCNC: 31 G/DL (ref 32–36)
MCV RBC AUTO: 90 FL (ref 80–99)
MONOCYTES # BLD AUTO: 1 10^3/UL (ref 0–1)
MONOCYTES NFR BLD AUTO: 7 % (ref 0–12)
NEUTROPHILS # BLD AUTO: 11.4 10^3/UL (ref 1.8–7.8)
NEUTROPHILS NFR BLD AUTO: 83 % (ref 42–75)
NITRITE UR QL STRIP: NEGATIVE
PH UR STRIP: 5.5 [PH] (ref 5–9)
PLATELET # BLD: 252 10^3/UL (ref 130–400)
PMV BLD AUTO: 10.8 FL (ref 9–12.2)
POTASSIUM SERPL-SCNC: 5.3 MMOL/L (ref 3.6–5)
PROT SERPL-MCNC: 6.9 GM/DL (ref 6.4–8.2)
PROT UR QL STRIP: (no result)
RBC #/AREA URNS HPF: (no result) /HPF
SODIUM SERPL-SCNC: 141 MMOL/L (ref 135–145)
SP GR UR STRIP: >=1.03 (ref 1.02–1.02)
T4 FREE SERPL-MCNC: 1.29 NG/DL (ref 0.7–1.48)
WBC # BLD AUTO: 13.7 10^3/UL (ref 4.3–11)
WBC #/AREA URNS HPF: >100 /HPF

## 2021-01-18 PROCEDURE — 81000 URINALYSIS NONAUTO W/SCOPE: CPT

## 2021-01-18 PROCEDURE — 70450 CT HEAD/BRAIN W/O DYE: CPT

## 2021-01-18 PROCEDURE — 86141 C-REACTIVE PROTEIN HS: CPT

## 2021-01-18 PROCEDURE — 84443 ASSAY THYROID STIM HORMONE: CPT

## 2021-01-18 PROCEDURE — 87088 URINE BACTERIA CULTURE: CPT

## 2021-01-18 PROCEDURE — 83735 ASSAY OF MAGNESIUM: CPT

## 2021-01-18 PROCEDURE — 84439 ASSAY OF FREE THYROXINE: CPT

## 2021-01-18 PROCEDURE — 80053 COMPREHEN METABOLIC PANEL: CPT

## 2021-01-18 PROCEDURE — 72125 CT NECK SPINE W/O DYE: CPT

## 2021-01-18 PROCEDURE — 51701 INSERT BLADDER CATHETER: CPT

## 2021-01-18 PROCEDURE — 36415 COLL VENOUS BLD VENIPUNCTURE: CPT

## 2021-01-18 PROCEDURE — 85025 COMPLETE CBC W/AUTO DIFF WBC: CPT

## 2021-01-18 NOTE — DIAGNOSTIC IMAGING REPORT
PROCEDURE: CT head and CT cervical spine without contrast.



TECHNIQUE: Multiple contiguous axial images were obtained through

the brain and cervical spine without the use of intravenous

contrast. Sagittal and coronal reformations through the cervical

spine were then performed. Auto Exposure Controls were utilized

during the CT exam to meet ALARA standards for radiation dose

reduction. 



INDICATION:  Increased confusion. Scalp contusion. Head and neck

pain.



COMPARISON: 02/06/2020.



FINDINGS: 

CT head: No large acute territorial ischemia, mass, or

hemorrhage. There is new chronic appearing subdural hematoma

along the right convexity overlying the right frontal lobe. No

midline shift or mass effect. Decreased attenuation is seen in

the periventricular and subcortical white matter. The ventricles

and cortical sulci are prominent. The basilar cisterns are patent

and unremarkable. 



The calvarium is intact. The visualized paranasal sinuses are

clear.



CT cervical spine: No acute fracture or dislocation is seen in

the cervical spine. No focal osseous lesions. There is reversal

of the normal lordotic curvature centered at the C4-C5 level.

Vertebral body heights are well-maintained. The craniocervical

junction is well-maintained. Moderate to severe degenerative

changes are seen in the cervical spine with disc osteophyte

complexes and uncovertebral arthropathy. Soft tissues of the neck

are unremarkable.



IMPRESSION:  

1. 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

overlying the right frontal lobe. No midline shift or mass

effect.

2. No acute hemorrhage or focal intra-axial mass.  No CT evidence

of large acute territorial ischemia.  

3. No acute fracture or dislocation in the cervical spine.

4. Generalized parenchymal volume loss with chronic microvascular

disease.



 



Dictated by: 



  Dictated on workstation # [id number]

## 2021-01-18 NOTE — NUR
[facility] notified of pt progress to discharge and plans made for 
arrangement of transport home.

## 2021-01-18 NOTE — ED GENERAL
General


Chief Complaint:  Altered Mental Status


Stated Complaint:  UTI


Nursing Triage Note:  


pt presents to ed from [facility] via ems for increased confusion, 


decreased appetite, and hallucinations. [facility] staff reports pt is 


currently on levaquin for UTI. Bruising noted to r upper forehead. Pt unsure if 


she had a recent fall.


Nursing Sepsis Screen:  No Definite Risk


Source of Information:  Patient, EMS, Nursing Home Records





History of Present Illness


Date Seen by Provider:  2021


Time Seen by Provider:  10:30


Initial Comments


This 75-year-old woman presents to the emergency room with confusion, decreased 

functionality, decreased appetite, and hallucinations.  [facility]  She is normally ambulatory with a walker but has not been able to 

ambulate with a walker today.  She was recently diagnosed with urinary tract 

infection and was started on Levaquin.  Urine culture results were reviewed by 

phone with Dr. Danielle's office.  She grew out Enterococcus and Aerococcus 

species.  Patient also seems to be more tremulous than normal.  On exam it is 

noted that she has old bruising to the right forehead.  This reportedly was 

noticed a few days ago by staff but already appeared old at that time.  No acute

treatment was sought.  Patient denies any pain or feeling ill in any way.  She 

does state that this is "the end of life" for her.





Allergies and Home Medications


Allergies


Coded Allergies:  


     codeine (Unverified  Allergy, Mild, 09)


     NSAIDS (Non-Steroidal Anti-Inflamma (Verified  Allergy, Unknown, 21)


     Sulfa (Sulfonamide Antibiotics) (Unverified  Allergy, Unknown, 8/3/14)


     sulfamethoxazole (Verified  Allergy, Unknown, 21)


     trimethoprim (Verified  Allergy, Unknown, 21)


Uncoded Allergies:  


     iv dye (Allergy, Unknown, 21)





Home Medications


Acetaminophen 500 Mg Tablet, 500-1,000 MG PO EVERY 4-6 HOURS PRN for PAIN-MILD 

(1-4), (Reported)


Amoxicillin/Potassium Clav 1 Each Tablet, 1 EACH PO BID


   Prescribed by: CECI DANIELLE on 20 0857


Bupropion HCl 200 Mg Tablet.er, 200 MG PO BID, (Reported)


Calcium Carbonate 600 Mg Tablet, 600 MG PO DAILY, (Reported)


Cefuroxime Axetil 500 Mg Tablet, 500 MG PO BID


   Prescribed by: JOSIE BRIDGES on 20 1553


Cholecalciferol (Vitamin D3) 5,000 Unit Capsule, 5,000 UNIT PO DAILY, (Reported)


Cyanocobalamin 1,000 Mcg/Ml Inj, 1,000 MCG IM MONTHLY ON THE , (Reported)


Cyanocobalamin (Vitamin B-12) 1,000 Mcg Tab.subl, 1,000 MCG SL DAILY, (Reported)


Fluvoxamine Maleate 100 Mg Tablet, 100 MG PO DAILY, (Reported)


Fluvoxamine Maleate 50 Mg Tablet, 50 MG PO HS, (Reported)


L. Acidophilus/Bulgaricus 1 Each Tablet, 1 TAB PO UD


   THREE TIMES DAILY X 1 WEEK THEN DAILY THEREAFTER 


   Prescribed by: CECI DANIELLE on 20 0857


Levothyroxine Sodium 88 Mcg Tablet, 88 MCG PO DAILY, (Reported)


Losartan Potassium 25 Mg Tablet, 25 MG PO HS, (Reported)


Memantine HCl 28 Mg Cap.spr.24, 28 MG PO DAILY, (Reported)


Metoprolol Tartrate 25 Mg Tablet, 37.5 MG PO BID, (Reported)


   TAKES 1 & 1/2 (25MG) TABLETS 


Mirtazapine 15 Mg Tablet, 15 MG PO HS, (Reported)


Omeprazole 20 Mg Capsule.dr, 20 MG PO HS, (Reported)


Potassium Chloride 20 Meq Tab.er.prt, 20 MEQ PO DAILY, (Reported)


Quetiapine Fumarate 25 Mg Tablet, 25 MG PO HS, (Reported)


Quetiapine Fumarate 25 Mg Tablet, 12.5 MG PO 0800,1400, (Reported)


   TAKES 1/2 (25MG) TABLET 


Simvastatin 10 Mg Tablet, 10 MG PO HS, (Reported)


Sucralfate 1 Gm Tablet, 1 GM PO UD PRN for STOMACH UPSET, (Reported)


Trimethoprim 100 Mg Tablet, 100 MG PO DAILY


   DO NOT RESTART UNTIL 2020 


   Prescribed by: CECI DANIELLE on 20 0857





Patient Home Medication List


Home Medication List Reviewed:  Yes





Review of Systems


Review of Systems


Constitutional:  no symptoms reported


EENTM:  no symptoms reported


Respiratory:  no symptoms reported


Cardiovascular:  no symptoms reported


Gastrointestinal:  no symptoms reported


Genitourinary:  see HPI


Pregnant:  No


Musculoskeletal:  no symptoms reported


Skin:  see HPI


Psychiatric/Neurological:  See HPI


Hematologic/Lymphatic:  No Symptoms Reported


Immunological/Allergic:  no symptoms reported





Past Medical-Social-Family Hx


Past Med/Social Hx:  Reviewed Nursing Past Med/Soc Hx


Patient Social History


Alcohol Use:  Denies Use


Smoking Status:  Unknown if Ever Smoked


Type Used:  Cigarettes


2nd Hand Smoke Exposure:  No


Recent Infectious Disease Expo:  No


Recent Hopitalizations:  No





Immunizations Up To Date


Tetanus Booster (TDap):  Unknown


Date of Influenza Vaccine:  Oct 1, 2019





Seasonal Allergies


Seasonal Allergies:  Yes





Past Medical History


Surgeries:  Yes (EGD/COLONOSCOPIES, BACK SURG;splenectomy)


Breast, Gallbladder, Hysterectomy, Orthopedic


Respiratory:  No


Cardiac:  Yes


High Cholesterol, Hypertension


Neurological:  No


Pregnant:  No


Reproductive Disorders:  Yes


Female Reproductive Disorders:  Denies


Sexually Transmitted Disease:  No


HIV/AIDS:  No


Genitourinary:  Yes


Bladder Infection


Gastrointestinal:  Yes


Colitis, Gastroesophageal Reflux, Gall Bladder Disease


Musculoskeletal:  Yes (CHRONIC GENERALIZED PAIN)


Arthritis, Back Injury, Chronic Back Pain


Endocrine:  Yes


Hypothyroidsim


Loss of Vision:  Bilateral


Hearing Impairment:  Hard of Hearing


Cancer:  No


Psychosocial:  Yes


Sleep Difficulties, Anxiety, Bipolar, Depression


Integumentary:  No


Blood Disorders:  No


Adverse Reaction/Blood Tranf:  No





Family Medical History





Alcoholism


  19 FATHER


  G8 BROTHER


CANC


  19 MOTHER


FH: cancer


Cancer, Hypertension





Physical Exam


Vital Signs





Vital Signs - First Documented








 21





 10:35


 


Temp 35.5


 


Pulse 83


 


Resp 16


 


B/P (MAP) 130/103 (112)


 


Pulse Ox 94





Capillary Refill : Less Than 3 Seconds


Height, Weight, BMI


Height: 5'4.00"


Weight: 166lbs. 9.0oz. 75.152419lw; 24.00 BMI


Method:


General Appearance:  No Apparent Distress, WD/WN


HEENT:  PERRL/EOMI, Other (Facial bruising with raised contusion of the right 

forehead.  These skin changes appear to be subacute or older in nature.)


Neck:  Normal Inspection


Respiratory:  Lungs Clear, Normal Breath Sounds


Cardiovascular:  Regular Rate, Rhythm, No Edema, No Murmur


Gastrointestinal:  Normal Bowel Sounds, Non Tender, Soft


Extremity:  Normal Inspection, Non Tender, No Pedal Edema


Neurologic/Psychiatric:  Alert, No Motor/Sensory Deficits, Normal Mood/Affect, 

CNs II-XII Norm as Tested, Other (Confused conversation)


Skin:  Normal Color, Warm/Dry, Ecchymosis





Progress/Results/Core Measures


Suspected Sepsis


Recent Fever Within 48 Hours:  No


Infection Criteria Present:  Documented Infection


New/Unexplained  Altered Menta:  Yes


Sepsis Screen:  No Definite Risk


SIRS


Temperature: 


Pulse: 83 


Respiratory Rate: 16


 


Laboratory Tests


21 10:32: White Blood Count 13.7H


Blood Pressure 130 /103 


Mean: 112


 


Laboratory Tests


21 10:32: 


Creatinine 2.96H, Platelet Count 252, Total Bilirubin 0.5








Results/Orders


Lab Results





Laboratory Tests








Test


 21


10:32 21


11:13 Range/Units


 


 


White Blood Count


 13.7 H


 


 4.3-11.0


10^3/uL


 


Red Blood Count


 4.54 


 


 3.80-5.11


10^6/uL


 


Hemoglobin 12.8   11.5-16.0  g/dL


 


Hematocrit 41   35-52  %


 


Mean Corpuscular Volume 90   80-99  fL


 


Mean Corpuscular Hemoglobin 28   25-34  pg


 


Mean Corpuscular Hemoglobin


Concent 31 L


 


 32-36  g/dL





 


Red Cell Distribution Width 15.2 H  10.0-14.5  %


 


Platelet Count


 252 


 


 130-400


10^3/uL


 


Mean Platelet Volume 10.8   9.0-12.2  fL


 


Immature Granulocyte % (Auto) 1    %


 


Neutrophils (%) (Auto) 83 H  42-75  %


 


Lymphocytes (%) (Auto) 9 L  12-44  %


 


Monocytes (%) (Auto) 7   0-12  %


 


Eosinophils (%) (Auto) 0   0-10  %


 


Basophils (%) (Auto) 0   0-10  %


 


Neutrophils # (Auto)


 11.4 H


 


 1.8-7.8


10^3/uL


 


Lymphocytes # (Auto)


 1.2 


 


 1.0-4.0


10^3/uL


 


Monocytes # (Auto)


 1.0 


 


 0.0-1.0


10^3/uL


 


Eosinophils # (Auto)


 0.0 


 


 0.0-0.3


10^3/uL


 


Basophils # (Auto)


 0.0 


 


 0.0-0.1


10^3/uL


 


Immature Granulocyte # (Auto)


 0.1 


 


 0.0-0.1


10^3/uL


 


Sodium Level 141   135-145  MMOL/L


 


Potassium Level 5.3 H  3.6-5.0  MMOL/L


 


Chloride Level 110 H    MMOL/L


 


Carbon Dioxide Level 20 L  21-32  MMOL/L


 


Anion Gap 11   5-14  MMOL/L


 


Blood Urea Nitrogen 47 H  7-18  MG/DL


 


Creatinine


 2.96 H


 


 0.60-1.30


MG/DL


 


Estimat Glomerular Filtration


Rate 15 


 


  





 


BUN/Creatinine Ratio 16    


 


Glucose Level 110 H    MG/DL


 


Calcium Level 9.4   8.5-10.1  MG/DL


 


Corrected Calcium 9.6   8.5-10.1  MG/DL


 


Magnesium Level 1.9   1.6-2.4  MG/DL


 


Total Bilirubin 0.5   0.1-1.0  MG/DL


 


Aspartate Amino Transf


(AST/SGOT) 16 


 


 5-34  U/L





 


Alanine Aminotransferase


(ALT/SGPT) 11 


 


 0-55  U/L





 


Alkaline Phosphatase 125     U/L


 


C-Reactive Protein High


Sensitivity 1.26 H


 


 0.00-0.50


MG/DL


 


Total Protein 6.9   6.4-8.2  GM/DL


 


Albumin 3.8   3.2-4.5  GM/DL


 


Thyroid Stimulating Hormone


(TSH) 1.66 


 


 0.35-4.94


UIU/ML


 


Free Thyroxine


 1.29 


 


 0.70-1.48


NG/DL


 


Urine Color  YELLOW   


 


Urine Clarity  SL CLOUDY   


 


Urine pH  5.5  5-9  


 


Urine Specific Gravity  >=1.030  1.016-1.022  


 


Urine Protein  TRACE H NEGATIVE  


 


Urine Glucose (UA)  NEGATIVE  NEGATIVE  


 


Urine Ketones  NEGATIVE  NEGATIVE  


 


Urine Nitrite  NEGATIVE  NEGATIVE  


 


Urine Bilirubin  NEGATIVE  NEGATIVE  


 


Urine Urobilinogen  0.2  < = 1.0  MG/DL


 


Urine Leukocyte Esterase  1+ H NEGATIVE  


 


Urine RBC (Auto)  NEGATIVE  NEGATIVE  


 


Urine RBC  NONE   /HPF


 


Urine WBC  >100 H  /HPF


 


Urine Squamous Epithelial


Cells 


 10-25 H


  /HPF





 


Urine Crystals  NONE   /LPF


 


Urine Bacteria  FEW H  /HPF


 


Urine Casts  NONE   /LPF


 


Urine Mucus  NEGATIVE   /LPF


 


Urine Culture Indicated  YES   








My Orders





Orders - SRINI BORJA MD


Cbc With Automated Diff (21 10:48)


Comprehensive Metabolic Panel (21 10:48)


Hs C Reactive Protein (21 10:48)


Magnesium (21 10:48)


Ua Culture If Indicated (21 10:48)


Thyroid Stimulating Hormone (21 10:48)


Free T4 (Free Thyroxine) (21 10:48)


Ed Iv/Invasive Line Start (21 10:48)


Ns Iv 1000 Ml (Sodium Chloride 0.9%) (21 11:00)


Ct Head/Cervical Spine Wo (21 10:52)


Urine Culture (21 11:13)


Ampicillin  For Iv Use (Ampicillin  For (21 13:45)





Medications Given in ED





Current Medications








 Medications  Dose


 Ordered  Sig/Sommer


 Route  Start Time


 Stop Time Status Last Admin


Dose Admin


 


 Ampicillin Sodium


 2000 mg/Sterile


 Water  14.8 ml @ 


 60 mls/hr  ONCE  ONCE


 IV  21 13:45


 21 13:59 DC 21 14:19


60 MLS/HR








Vital Signs/I&O











 21





 10:35


 


Temp 35.5


 


Pulse 83


 


Resp 16


 


B/P (MAP) 130/103 (112)


 


Pulse Ox 94





Capillary Refill : Less Than 3 Seconds








Blood Pressure Mean:                    112








Progress Note :  


   Time:  14:28


Progress Note


Patient still appears to have pyuria by urinalysis.  CT of the head and C-spine 

demonstrated an old or stable subdural hematoma.  This could correlate with her 

fall about a week ago.  No interventions are necessary for this injury at this 

time.  I discussed the situation with Dr. Danielle.  She would like to treat 

with ampicillin here in the ER based on culture results and follow that with an 

amoxicillin prescription that she will provide.  Patient received a liter of IV 

normal saline.





Diagnostic Imaging





   Diagonstic Imaging:  CT


   Plain Films/CT/US/NM/MRI:  c-spine, head


Comments


CT head and cervical spine viewed by me and report reviewed.  See report below:





NAME:   WADE WILLINGHAM  


[facility] REC#:   [id number]  


ACCOUNT#:   [id number]  


PT STATUS:   REG ER  


:   1945  


PHYSICIAN:   SRINI BORJA MD  


ADMIT DATE:   21/ER  


                                                                     ***Ketan

gned***  


Date of Exam:21  


 


CT HEAD/CERVICAL SPINE WO  


 


PROCEDURE: CT head and CT cervical spine without contrast.  


 


 TECHNIQUE: Multiple contiguous axial images were obtained through  


 the brain and cervical spine without the use of intravenous  


 contrast. Sagittal and coronal reformations through the cervical  


 spine were then performed. Auto Exposure Controls were utilized  


 during the CT exam to meet ALARA standards for radiation dose  


 reduction.   


 


 INDICATION:  Increased confusion. Scalp contusion. Head and neck  


 pain.  


 


 COMPARISON: 2020.  


 


 FINDINGS:   


 CT head: No large acute territorial ischemia, mass, or  


 hemorrhage. There is new chronic appearing subdural hematoma  


 along the right convexity overlying the right frontal lobe. No  


 midline shift or mass effect. Decreased attenuation is seen in  


 the periventricular and subcortical white matter. The ventricles  


 and cortical sulci are prominent. The basilar cisterns are patent  


 and unremarkable.   


 


 The calvarium is intact. The visualized paranasal sinuses are  


 clear.  


 


 CT cervical spine: No acute fracture or dislocation is seen in  


 the cervical spine. No focal osseous lesions. There is reversal  


 of the normal lordotic curvature centered at the C4-C5 level.  


 Vertebral body heights are well-maintained. The craniocervical  


 junction is well-maintained. Moderate to severe degenerative  


 changes are seen in the cervical spine with disc osteophyte  


 complexes and uncovertebral arthropathy. Soft tissues of the neck  


 are unremarkable.  


 


 IMPRESSION:    


 1. 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  


 overlying the right frontal lobe. No midline shift or mass  


 effect.  


 2. No acute hemorrhage or focal intra-axial mass.  No CT evidence  


 of large acute territorial ischemia.    


 3. No acute fracture or dislocation in the cervical spine.  


 4. Generalized parenchymal volume loss with chronic microvascular  


 disease.  


 


 Dictated by:   


 


   Dictated on workstation # [id number]  


 


Dict:   21 [id number]  


Trans:   21 1251  


[facility] [id number]  


 


Interpreted by:     JOSR KITCHEN DO  


Electronically signed by: JOSR KITCHEN DO 21 1251





Departure


Impression





   Primary Impression:  


   Subdural hematoma


   Additional Impressions:  


   Urinary tract infection


   Qualified Codes:  N39.0 - Urinary tract infection, site not specified


   Altered mental status


   Qualified Codes:  R41.82 - Altered mental status, unspecified


   Tremor


   Fall on same level


   Qualified Codes:  W18.30XA - Fall on same level, unspecified, initial 

   encounter


Disposition:   HOME, SELF-CARE


Condition:  Improved





Departure-Patient Inst.


Decision time for Depature:  14:01


Referrals:  


CECI DANIELLE MD (PCP/Family)


Primary Care Physician


Patient Instructions:  Subdural Hematoma, Urinary Tract Infection, Adult (DC)





Add. Discharge Instructions:  


See orders for new antibiotics from Dr. Danielle.


Stop Levaquin (levofloxacin).


Assist patient with ambulation and transfers.


Encourage plenty of clear liquids.





All discharge instructions reviewed with patient and/or family. Voiced 

understanding.





Copy


Copies To 1:   CECI DANIELLE MD, JOSHUA T MD        2021 13:38

## 2021-02-22 ENCOUNTER — HOSPITAL ENCOUNTER (EMERGENCY)
Dept: HOSPITAL 75 - ER | Age: 76
Discharge: HOME | End: 2021-02-22
Payer: MEDICARE

## 2021-02-22 VITALS — BODY MASS INDEX: 24.38 KG/M2 | HEIGHT: 65.75 IN | WEIGHT: 149.91 LBS

## 2021-02-22 VITALS — SYSTOLIC BLOOD PRESSURE: 124 MMHG | DIASTOLIC BLOOD PRESSURE: 65 MMHG

## 2021-02-22 DIAGNOSIS — Z91.041: ICD-10-CM

## 2021-02-22 DIAGNOSIS — I10: ICD-10-CM

## 2021-02-22 DIAGNOSIS — F31.9: ICD-10-CM

## 2021-02-22 DIAGNOSIS — Z80.9: ICD-10-CM

## 2021-02-22 DIAGNOSIS — Z79.890: ICD-10-CM

## 2021-02-22 DIAGNOSIS — E03.9: ICD-10-CM

## 2021-02-22 DIAGNOSIS — Z88.6: ICD-10-CM

## 2021-02-22 DIAGNOSIS — F17.210: ICD-10-CM

## 2021-02-22 DIAGNOSIS — K21.9: ICD-10-CM

## 2021-02-22 DIAGNOSIS — Z82.49: ICD-10-CM

## 2021-02-22 DIAGNOSIS — Z88.5: ICD-10-CM

## 2021-02-22 DIAGNOSIS — S61.210A: Primary | ICD-10-CM

## 2021-02-22 DIAGNOSIS — E78.00: ICD-10-CM

## 2021-02-22 DIAGNOSIS — R40.2410: ICD-10-CM

## 2021-02-22 DIAGNOSIS — S00.11XA: ICD-10-CM

## 2021-02-22 DIAGNOSIS — Z88.1: ICD-10-CM

## 2021-02-22 DIAGNOSIS — F41.9: ICD-10-CM
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
## 2021-02-22 DIAGNOSIS — Z88.2: ICD-10-CM

## 2021-02-22 PROCEDURE — 72125 CT NECK SPINE W/O DYE: CPT

## 2021-02-22 PROCEDURE — 12001 RPR S/N/AX/GEN/TRNK 2.5CM/<: CPT

## 2021-02-22 PROCEDURE — 70450 CT HEAD/BRAIN W/O DYE: CPT

## 2021-02-22 NOTE — ED FALL/INJURY
General


Chief Complaint:  Trauma-Non Activation


Stated Complaint:  FELL


Nursing Triage Note:  


UNWITNESSED FALL


Source:  patient, EMS


Exam Limitations:  clinical condition (Dementia, hard of hearing)





History of Present Illness


Date Seen by Provider:  2021


Time Seen by Provider:  08:15


Initial Comments


Patient presents ER by EMS from assisted living with chief complaint that she 

had an unwitnessed fall and a laceration on her right index finger and large 

hematoma over her right eyebrow.  Patient denies any pain.  The patient is not 

on blood thinners.  She is at baseline per nursing staff in terms of 

orientation.  She is quite hard of hearing.  She is denying any nausea shortness

of breath cough fever.  She is on treatment for a UTI presently.





Allergies and Home Medications


Allergies


Coded Allergies:  


     codeine (Unverified  Allergy, Mild, 09)


     NSAIDS (Non-Steroidal Anti-Inflamma (Verified  Allergy, Unknown, 21)


     Sulfa (Sulfonamide Antibiotics) (Unverified  Allergy, Unknown, 8/3/14)


     sulfamethoxazole (Verified  Allergy, Unknown, 21)


     trimethoprim (Verified  Allergy, Unknown, 21)


Uncoded Allergies:  


     iv dye (Allergy, Unknown, 21)





Home Medications


Acetaminophen 500 Mg Tablet, 500-1,000 MG PO EVERY 4-6 HOURS PRN for PAIN-MILD 

(1-4), (Reported)


Amoxicillin/Potassium Clav 1 Each Tablet, 1 EACH PO BID


   Prescribed by: CECI CABRERA on 20 0857


Bupropion HCl 200 Mg Tablet.er, 200 MG PO BID, (Reported)


Calcium Carbonate 600 Mg Tablet, 600 MG PO DAILY, (Reported)


Cefuroxime Axetil 500 Mg Tablet, 500 MG PO BID


   Prescribed by: JOSIE BRIDGES on 20 8643


Cholecalciferol (Vitamin D3) 5,000 Unit Capsule, 5,000 UNIT PO DAILY, (Reported)


Cyanocobalamin 1,000 Mcg/Ml Inj, 1,000 MCG IM MONTHLY ON THE , (Reported)


Cyanocobalamin (Vitamin B-12) 1,000 Mcg Tab.subl, 1,000 MCG SL DAILY, (Reported)


Fluvoxamine Maleate 100 Mg Tablet, 100 MG PO DAILY, (Reported)


Fluvoxamine Maleate 50 Mg Tablet, 50 MG PO HS, (Reported)


L. Acidophilus/Bulgaricus 1 Each Tablet, 1 TAB PO UD


   THREE TIMES DAILY X 1 WEEK THEN DAILY THEREAFTER 


   Prescribed by: CECI CABRERA on 20 0857


Levothyroxine Sodium 88 Mcg Tablet, 88 MCG PO DAILY, (Reported)


Losartan Potassium 25 Mg Tablet, 25 MG PO HS, (Reported)


Memantine HCl 28 Mg Cap.spr.24, 28 MG PO DAILY, (Reported)


Metoprolol Tartrate 25 Mg Tablet, 37.5 MG PO BID, (Reported)


   TAKES 1 & 1/2 (25MG) TABLETS 


Mirtazapine 15 Mg Tablet, 15 MG PO HS, (Reported)


Omeprazole 20 Mg Capsule.dr, 20 MG PO HS, (Reported)


Potassium Chloride 20 Meq Tab.er.prt, 20 MEQ PO DAILY, (Reported)


Quetiapine Fumarate 25 Mg Tablet, 25 MG PO HS, (Reported)


Quetiapine Fumarate 25 Mg Tablet, 12.5 MG PO 0800,1400, (Reported)


   TAKES 1/2 (25MG) TABLET 


Simvastatin 10 Mg Tablet, 10 MG PO HS, (Reported)


Sucralfate 1 Gm Tablet, 1 GM PO UD PRN for STOMACH UPSET, (Reported)


Trimethoprim 100 Mg Tablet, 100 MG PO DAILY


   DO NOT RESTART UNTIL 2020 


   Prescribed by: CECI CABRERA on 20 0857





Patient Home Medication List


Home Medication List Reviewed:  Yes





Review of Systems


Review of Systems


Constitutional:  No chills, No diaphoresis


Eyes:  Denies Blindness, Denies Blurred Vision


Ears, Nose, Mouth, Throat:  denies ear pain, denies ear discharge


Respiratory:  No cough, No short of breath


Cardiovascular:  No chest pain, No palpitations


Gastrointestinal:  No abdominal pain, No nausea, No vomiting


Genitourinary:  No discharge, No dysuria


Musculoskeletal:  see HPI


Skin:  see HPI





All Other Systems Reviewed


Negative Unless Noted:  Yes





Past Medical-Social-Family Hx


Patient Social History


Alcohol Use:  Denies Use


Smoking Status:  Current Everyday Smoker


Type Used:  Cigarettes


2nd Hand Smoke Exposure:  No


Recent Hopitalizations:  No





Immunizations Up To Date


Tetanus Booster (TDap):  Unknown


Date of Influenza Vaccine:  Oct 1, 2019





Seasonal Allergies


Seasonal Allergies:  Yes





Past Medical History


Surgeries:  Yes (EGD/COLONOSCOPIES, BACK SURG;splenectomy)


Breast, Gallbladder, Hysterectomy, Orthopedic


Respiratory:  No


Cardiac:  Yes


High Cholesterol, Hypertension


Neurological:  No


Reproductive Disorders:  Yes


Female Reproductive Disorders:  Denies


Sexually Transmitted Disease:  No


HIV/AIDS:  No


Genitourinary:  Yes


Bladder Infection


Gastrointestinal:  Yes


Colitis, Gastroesophageal Reflux, Gall Bladder Disease


Musculoskeletal:  Yes (CHRONIC GENERALIZED PAIN)


Arthritis, Back Injury, Chronic Back Pain


Endocrine:  Yes


Hypothyroidsim


Loss of Vision:  Bilateral


Hearing Impairment:  Hard of Hearing


Cancer:  No


Psychosocial:  Yes


Sleep Difficulties, Anxiety, Bipolar, Depression


Integumentary:  No


Blood Disorders:  No


Adverse Reaction/Blood Tranf:  No





Family Medical History





Alcoholism


  19 FATHER


  G8 BROTHER


CANC


  19 MOTHER


FH: cancer


Cancer, Hypertension





Physical Exam


Vital Signs





Vital Signs - First Documented








 21





 08:15


 


Temp 36.2


 


Pulse 81


 


Resp 20


 


B/P (MAP) 160/71 (100)


 


Pulse Ox 94


 


O2 Delivery Room Air





Capillary Refill :


Height, Weight, BMI


Height: 5'4.00"


Weight: 166lbs. 9.0oz. 75.148401hq; 24.00 BMI


Method:


General Appearance:  WD/WN, no apparent distress


HEENT:  PERRL/EOMI, pharynx normal


Neck:  full range of motion, normal inspection


Cardiovascular:  normal peripheral pulses, regular rate, rhythm


Respiratory:  lungs clear, normal breath sounds, no respiratory distress, no 

accessory muscle use


Peripheral Pulses:  2+ Radial Pulses (R), 2+ Radial Pulses (L)


Gastrointestinal:  normal bowel sounds, non tender, soft


Neurologic/Psychiatric:  alert, normal mood/affect, other (Person and place but 

not time or situation)


Skin:  other (Large 5 cm long by 3 cm wide hematoma over the right eyebrow.  

Laceration on the index finger in a J shape approximately 3 cm long on the pad 

of the right index finger.)





Simon Coma Score


Best Eye Response:  (4) Open Spontaneously


Best Verbal Response:  (5) Oriented


Best Motor Response:  (6) Obeys Commands


Simon Total:  15





Procedures/Interventions





   Wound Location:  Upper Extremities


Other Wound Location


Right index finger pad distal phalanx


   Wound Length (cm):  3


   Wound's Depth, Shape:  linear (Curvilinear), sub Q


   Wound Explored:  no foreign body removed


   Irrigated w/ Saline (ccs):  200


   Betadine Prep?:  Yes (Chlorhexidine)


   Anesthesia:  1% Lidocaine


   Volume Anesthetic (ccs):  5


   Suture:  Ethlion


   Suture Size:  5-0


   Number of Sutures:  5


   Sterile Dressing Applied?:  Yes


Progress


Wound was cleaned thoroughly using chlorhexidine and sterile saline and flushed.

 In the usual fashion we applied a nerve block of the digit and applied another 

couple cc into the pad of the finger to obtain adequate analgesia.  Wound edges 

were reapproximated and closed using 5 simple interrupted sutures.  Patient 

tolerated the procedure very well.





Progress/Results/Core Measures


Results/Orders


My Orders





Orders - UBALDO WOLF


Ct Head/Cervical Spine Wo (21 08:26)





Vital Signs/I&O











 21





 08:15


 


Temp 36.2


 


Pulse 81


 


Resp 20


 


B/P (MAP) 160/71 (100)


 


Pulse Ox 94


 


O2 Delivery Room Air











Progress


Progress Note :  


   Time:  08:47


Progress Note


Cleaned and repaired the finger laceration.  CT of the head and C-spine were 

ordered.  Plan to put her on cefdinir which would cover for infection of the 

finger as well as UTI.





Diagnostic Imaging





   Diagonstic Imaging:  CT


   Plain Films/CT/US/NM/MRI:  c-spine, head


Comments


NAME:   WADE WILLINGHAM


[facility] REC#:   [id number]


ACCOUNT#:   [id number]


PT STATUS:   REG ER


:   1945


PHYSICIAN:   UBALDO WOLF MD


ADMIT DATE:   21/ER


                                   ***Draft***


Date of Exam:21





CT HEAD/CERVICAL SPINE WO








PROCEDURE: CT head and CT cervical spine without contrast.





TECHNIQUE: Multiple contiguous axial images were obtained through


the brain and cervical spine without the use of intravenous


contrast. Sagittal and coronal reformations through the cervical


spine were then performed. Auto Exposure Controls were utilized


during the CT exam to meet ALARA standards for radiation dose


reduction. 





INDICATION: Trauma, unwitnessed fall with a hematoma to the right


forehead.





COMPARISON: Correlation is made with prior CT from 2021.





FINDINGS:





CT HEAD:





Prominence of the ventricles and sulci is again noted, consistent


with cerebral atrophy. The chronic-appearing subdural along the


right frontal convexity appears smaller on today's study. No


acute intra-axial or extra-axial hemorrhage is identified.


Cisterns are patent. There is some mucosal thickening of the


maxillary sinuses bilaterally as well as the sphenoid sinus and


ethmoid air cells. There is a hematoma in the right frontal


scalp.





IMPRESSION:


1. Right frontal scalp hematoma.


2. No acute intracranial process is detected. The


chronic-appearing subdural along the right frontal convexity on


prior exam does appear to be smaller.





CT CERVICAL SPINE:





Alignment is normal. There appears to be partial osseous fusion


between the C4-C5 as well as C5-C6 and C6-C7 vertebral bodies.


There is severe multilevel degenerative disc disease with


variable disc space narrowing and marginal spurring. There is


multilevel facet arthropathy. Prevertebral tissues are within


normal limits. Odontoid appears to be intact.





IMPRESSION: Cervical spondylosis. No acute bony abnormality is


detected.





  Dictated on workstation # [id number]








Dict:   21 0915


Trans:   21 0931


AS6 [id number]





Interpreted by:     VELVET CHAVARRIA MD


Electronically signed by:


   Reviewed:  Reviewed by Me





Departure


Impression





   Primary Impression:  


   Fall on same level


   Qualified Codes:  W18.30XA - Fall on same level, unspecified, initial 

   encounter


   Additional Impression:  


   Finger laceration


   Qualified Codes:  S61.210A - Laceration without foreign body of right index 

   finger without damage to nail, initial encounter


Disposition:  01 HOME, SELF-CARE


Condition:  Improved





Departure-Patient Inst.


Decision time for Depature:  09:38


Referrals:  


CECI CABRERA MD (PCP/Family)


Primary Care Physician


Patient Instructions:  HEMATOMA, Laceration Repair With Manoj (DC), Laceration

Repair With Stitches (DC)





Add. Discharge Instructions:  


Ice pack for 20 minutes every 2 hours while awake for swelling and pain on the 

hematoma over her right eyebrow.


Tylenol and/or ibuprofen as necessary for pain.


Keep the finger clean with regular soap and water at least daily and apply a 

thin layer of Vaseline or triple antibiotic ointment and replace the dressing 

daily or more frequently as necessary.


Have the sutures out in 10 to 14 days by the nurse or you may return to the ER 

to have this done at no additional charge.


Cefdinir 1 capsule twice a day for the next 7 days to prevent infection.


Probiotics 1 tablet twice a day for the next 7 days to prevent diarrhea.





All discharge instructions reviewed with patient and/or family. Voiced 

understanding.


Scripts


Cefdinir (Cefdinir) 300 Mg Capsule


300 MG PO BID for 7 Days, #14 CAP 0 Refills


   Prov: UBALDO WOLF         21 


L.acidoph & Paracasei,B.lactis (Probiotic) 1 Each Capsule


1 EACH PO BID for 7 Days, #14 CAP 0 Refills


   Prov: UBALDO WOLF         21





Copy


Copies To 1:   CECI CABRERA MD, TITUS J                 2021 08:48

## 2021-02-22 NOTE — DIAGNOSTIC IMAGING REPORT
PROCEDURE: CT head and CT cervical spine without contrast.



TECHNIQUE: Multiple contiguous axial images were obtained through

the brain and cervical spine without the use of intravenous

contrast. Sagittal and coronal reformations through the cervical

spine were then performed. Auto Exposure Controls were utilized

during the CT exam to meet ALARA standards for radiation dose

reduction. 



INDICATION: Trauma, unwitnessed fall with a hematoma to the right

forehead.



COMPARISON: Correlation is made with prior CT from 01/18/2021.



FINDINGS:



CT HEAD:



Prominence of the ventricles and sulci is again noted, consistent

with cerebral atrophy. The chronic-appearing subdural along the

right frontal convexity appears smaller on today's study. No

acute intra-axial or extra-axial hemorrhage is identified.

Cisterns are patent. There is some mucosal thickening of the

maxillary sinuses bilaterally as well as the sphenoid sinus and

ethmoid air cells. There is a hematoma in the right frontal

scalp.



IMPRESSION:

1. Right frontal scalp hematoma.

2. No acute intracranial process is detected. The

chronic-appearing subdural along the right frontal convexity on

prior exam does appear to be smaller.



CT CERVICAL SPINE:



Alignment is normal. There appears to be partial osseous fusion

between the C4-C5 as well as C5-C6 and C6-C7 vertebral bodies.

There is severe multilevel degenerative disc disease with

variable disc space narrowing and marginal spurring. There is

multilevel facet arthropathy. Prevertebral tissues are within

normal limits. Odontoid appears to be intact.



IMPRESSION: Cervical spondylosis. No acute bony abnormality is

detected.



Dictated by: 



  Dictated on workstation # [id number]

## 2021-04-19 ENCOUNTER — HOSPITAL ENCOUNTER (OUTPATIENT)
Dept: HOSPITAL 75 - RAD | Age: 76
End: 2021-04-19
Attending: NURSE PRACTITIONER
Payer: MEDICARE

## 2021-04-19 DIAGNOSIS — R11.2: Primary | ICD-10-CM

## 2021-04-19 PROCEDURE — 74018 RADEX ABDOMEN 1 VIEW: CPT

## 2021-04-19 NOTE — DIAGNOSTIC IMAGING REPORT
INDICATION: 

Nausea and vomiting.



EXAMINATION:

KUB at 9:55 AM.



FINDINGS:

There are degenerative changes in the lumbar spine. The patient

has had a previous fusion of L4-L5. The gallbladder appears to be

surgically absent. The bowel gas pattern is normal. There is a 5

mm calcification lateral to the L2 vertebra on the left which

could be a ureteral calculus.



IMPRESSION: 

Questionable left proximal ureteral calculus. No evidence of

bowel obstruction.



Dictated by: 



  Dictated on workstation # [id number]

## 2021-04-20 ENCOUNTER — HOSPITAL ENCOUNTER (OUTPATIENT)
Dept: HOSPITAL 75 - SDC | Age: 76
End: 2021-04-20
Attending: NURSE PRACTITIONER
Payer: MEDICARE

## 2021-04-20 VITALS — SYSTOLIC BLOOD PRESSURE: 138 MMHG | DIASTOLIC BLOOD PRESSURE: 68 MMHG

## 2021-04-20 DIAGNOSIS — E86.0: Primary | ICD-10-CM

## 2021-04-20 LAB
ALBUMIN SERPL-MCNC: 3.3 GM/DL (ref 3.2–4.5)
ALP SERPL-CCNC: 103 U/L (ref 40–136)
ALT SERPL-CCNC: 8 U/L (ref 0–55)
BILIRUB SERPL-MCNC: 0.7 MG/DL (ref 0.1–1)
BUN/CREAT SERPL: 8
CALCIUM SERPL-MCNC: 9.1 MG/DL (ref 8.5–10.1)
CHLORIDE SERPL-SCNC: 95 MMOL/L (ref 98–107)
CO2 SERPL-SCNC: 22 MMOL/L (ref 21–32)
CREAT SERPL-MCNC: 1.27 MG/DL (ref 0.6–1.3)
GFR SERPLBLD BASED ON 1.73 SQ M-ARVRAT: 41 ML/MIN
GLUCOSE SERPL-MCNC: 164 MG/DL (ref 70–105)
HCT VFR BLD CALC: 48 % (ref 35–52)
HGB BLD-MCNC: 15.2 G/DL (ref 11.5–16)
MCH RBC QN AUTO: 27 PG (ref 25–34)
MCHC RBC AUTO-ENTMCNC: 32 G/DL (ref 32–36)
MCV RBC AUTO: 85 FL (ref 80–99)
PLATELET # BLD: 299 10^3/UL (ref 130–400)
PMV BLD AUTO: 9.5 FL (ref 9–12.2)
POTASSIUM SERPL-SCNC: 3.1 MMOL/L (ref 3.6–5)
PROT SERPL-MCNC: 6.9 GM/DL (ref 6.4–8.2)
SODIUM SERPL-SCNC: 135 MMOL/L (ref 135–145)
WBC # BLD AUTO: 11 10^3/UL (ref 4.3–11)

## 2021-04-20 PROCEDURE — 36415 COLL VENOUS BLD VENIPUNCTURE: CPT

## 2021-04-20 PROCEDURE — 85027 COMPLETE CBC AUTOMATED: CPT

## 2021-04-20 PROCEDURE — 96360 HYDRATION IV INFUSION INIT: CPT

## 2021-04-20 PROCEDURE — 80053 COMPREHEN METABOLIC PANEL: CPT
